# Patient Record
Sex: MALE | Race: WHITE | NOT HISPANIC OR LATINO | Employment: OTHER | URBAN - METROPOLITAN AREA
[De-identification: names, ages, dates, MRNs, and addresses within clinical notes are randomized per-mention and may not be internally consistent; named-entity substitution may affect disease eponyms.]

---

## 2022-08-22 ENCOUNTER — TRANSFERRED RECORDS (OUTPATIENT)
Dept: HEALTH INFORMATION MANAGEMENT | Facility: CLINIC | Age: 77
End: 2022-08-22

## 2022-09-01 ENCOUNTER — TRANSFERRED RECORDS (OUTPATIENT)
Dept: HEALTH INFORMATION MANAGEMENT | Facility: CLINIC | Age: 77
End: 2022-09-01

## 2022-09-06 ENCOUNTER — TRANSFERRED RECORDS (OUTPATIENT)
Dept: HEALTH INFORMATION MANAGEMENT | Facility: CLINIC | Age: 77
End: 2022-09-06

## 2022-09-21 ENCOUNTER — TRANSFERRED RECORDS (OUTPATIENT)
Dept: HEALTH INFORMATION MANAGEMENT | Facility: CLINIC | Age: 77
End: 2022-09-21

## 2022-10-19 ENCOUNTER — MEDICAL CORRESPONDENCE (OUTPATIENT)
Dept: HEALTH INFORMATION MANAGEMENT | Facility: CLINIC | Age: 77
End: 2022-10-19

## 2022-10-20 ENCOUNTER — TRANSFERRED RECORDS (OUTPATIENT)
Dept: HEALTH INFORMATION MANAGEMENT | Facility: CLINIC | Age: 77
End: 2022-10-20

## 2022-11-17 ENCOUNTER — OFFICE VISIT (OUTPATIENT)
Dept: FAMILY MEDICINE | Facility: CLINIC | Age: 77
End: 2022-11-17
Payer: MEDICARE

## 2022-11-17 VITALS
BODY MASS INDEX: 31.81 KG/M2 | OXYGEN SATURATION: 97 % | WEIGHT: 227.2 LBS | HEIGHT: 71 IN | SYSTOLIC BLOOD PRESSURE: 151 MMHG | RESPIRATION RATE: 20 BRPM | HEART RATE: 52 BPM | DIASTOLIC BLOOD PRESSURE: 80 MMHG | TEMPERATURE: 96.8 F

## 2022-11-17 DIAGNOSIS — Z01.818 PREOP GENERAL PHYSICAL EXAM: Primary | ICD-10-CM

## 2022-11-17 DIAGNOSIS — E78.5 HYPERLIPIDEMIA LDL GOAL <160: ICD-10-CM

## 2022-11-17 DIAGNOSIS — R03.0 ELEVATED BLOOD PRESSURE READING WITHOUT DIAGNOSIS OF HYPERTENSION: ICD-10-CM

## 2022-11-17 DIAGNOSIS — M54.42 CHRONIC LEFT-SIDED LOW BACK PAIN WITH LEFT-SIDED SCIATICA: ICD-10-CM

## 2022-11-17 DIAGNOSIS — G89.29 CHRONIC LEFT-SIDED LOW BACK PAIN WITH LEFT-SIDED SCIATICA: ICD-10-CM

## 2022-11-17 DIAGNOSIS — E87.0 HYPERNATREMIA: ICD-10-CM

## 2022-11-17 LAB
ANION GAP SERPL CALCULATED.3IONS-SCNC: 15 MMOL/L (ref 7–15)
BUN SERPL-MCNC: 12 MG/DL (ref 8–23)
CALCIUM SERPL-MCNC: 9 MG/DL (ref 8.8–10.2)
CHLORIDE SERPL-SCNC: 114 MMOL/L (ref 98–107)
CREAT SERPL-MCNC: 0.89 MG/DL (ref 0.67–1.17)
DEPRECATED HCO3 PLAS-SCNC: 23 MMOL/L (ref 22–29)
ERYTHROCYTE [DISTWIDTH] IN BLOOD BY AUTOMATED COUNT: 13.1 % (ref 10–15)
GFR SERPL CREATININE-BSD FRML MDRD: 89 ML/MIN/1.73M2
GLUCOSE SERPL-MCNC: 93 MG/DL (ref 70–99)
HCT VFR BLD AUTO: 44.5 % (ref 40–53)
HGB BLD-MCNC: 15.3 G/DL (ref 13.3–17.7)
MCH RBC QN AUTO: 30.4 PG (ref 26.5–33)
MCHC RBC AUTO-ENTMCNC: 34.4 G/DL (ref 31.5–36.5)
MCV RBC AUTO: 89 FL (ref 78–100)
PLATELET # BLD AUTO: 192 10E3/UL (ref 150–450)
POTASSIUM SERPL-SCNC: 4.4 MMOL/L (ref 3.4–5.3)
RBC # BLD AUTO: 5.03 10E6/UL (ref 4.4–5.9)
SODIUM SERPL-SCNC: 152 MMOL/L (ref 136–145)
WBC # BLD AUTO: 5 10E3/UL (ref 4–11)

## 2022-11-17 PROCEDURE — 36415 COLL VENOUS BLD VENIPUNCTURE: CPT | Performed by: PHYSICIAN ASSISTANT

## 2022-11-17 PROCEDURE — 93000 ELECTROCARDIOGRAM COMPLETE: CPT | Performed by: PHYSICIAN ASSISTANT

## 2022-11-17 PROCEDURE — 85027 COMPLETE CBC AUTOMATED: CPT | Performed by: PHYSICIAN ASSISTANT

## 2022-11-17 PROCEDURE — 80048 BASIC METABOLIC PNL TOTAL CA: CPT | Performed by: PHYSICIAN ASSISTANT

## 2022-11-17 PROCEDURE — 99204 OFFICE O/P NEW MOD 45 MIN: CPT | Performed by: PHYSICIAN ASSISTANT

## 2022-11-17 RX ORDER — MULTIVITAMIN
1 TABLET ORAL DAILY
COMMUNITY

## 2022-11-17 RX ORDER — ATORVASTATIN CALCIUM 40 MG/1
40 TABLET, FILM COATED ORAL DAILY
Status: CANCELLED | OUTPATIENT
Start: 2022-11-17

## 2022-11-17 RX ORDER — ATORVASTATIN CALCIUM 40 MG/1
40 TABLET, FILM COATED ORAL DAILY
Qty: 90 TABLET | Refills: 0 | Status: SHIPPED | OUTPATIENT
Start: 2022-11-17 | End: 2023-02-27

## 2022-11-17 RX ORDER — ATORVASTATIN CALCIUM 40 MG/1
40 TABLET, FILM COATED ORAL DAILY
COMMUNITY
Start: 2022-01-01 | End: 2022-11-17

## 2022-11-17 RX ORDER — ASPIRIN 81 MG/1
1 TABLET, CHEWABLE ORAL EVERY 24 HOURS
Status: ON HOLD | COMMUNITY
End: 2022-12-27

## 2022-11-17 RX ORDER — AMOXICILLIN 500 MG
1 CAPSULE ORAL DAILY
COMMUNITY

## 2022-11-17 ASSESSMENT — PAIN SCALES - GENERAL: PAINLEVEL: SEVERE PAIN (6)

## 2022-11-17 NOTE — PROGRESS NOTES
[address], [address]  [facility] [address]  Phone: [phone]  Primary Provider: No primary care provider on file.  Pre-op Performing Provider: MERRY ARANA PA-C      PREOPERATIVE EVALUATION:  Today's date: 11/17/2022    Bryon Meyer is a 77 year old male who presents for a preoperative evaluation.    Surgical Information:  Surgery/Procedure: LUMBAR 5 TO SACRAL 1 ANTERIOR LUMBAR INTERBODY FUSION WITH POSTERIOR INSTRUMENTED SPINAL FUSION. POSSIBLE REMOVAL OF HARDWARE of previous posterior lumbar instrumentation lumbar 3 to lumbar 5  Surgery Location:  OR  Surgeon: Luis De La Fuente and Arthur Brandt  Surgery Date: 11/22/2022  Time of Surgery: 12:30 PM  Where patient plans to recover: At home with family  Fax number for surgical facility: Note does not need to be faxed, will be available electronically in Epic.    Type of Anesthesia Anticipated: General    Assessment & Plan     The proposed surgical procedure is considered LOW risk.    Preop general physical exam  - pt advised to stop his asa, fish oil and vit E until after surgery  - pt is scheduled for his covid PCR test tomorrow at [facility]  - EKG 12-lead complete w/read - Clinics  - Basic metabolic panel  (Ca, Cl, CO2, Creat, Gluc, K, Na, BUN)  - CBC with platelets    Chronic left-sided low back pain with left-sided sciatica  - preop covid test dated 11/18/22 negative.    Hyperlipidemia LDL goal <160    - atorvastatin (LIPITOR) 40 MG tablet; Take 1 tablet (40 mg) by mouth daily    Elevated blood pressure reading without diagnosis of hypertension  - recd pt purchase an [employer] home BP monitor and check readings.   - recd BP be kept <140/90  - pt wonders if elevated BP related to his back pain; will follow  - will arrange pt to [facility] care with new PCP here since his former PCP in [place]    Hypernatremia  - pt advised to increase water intake and follow a low sodium diet and return for recheck of sodium on 11/21/22  -Rechecked sodium  today (11/21/22) and it has normalized.      Risks and Recommendations:  The patient has the following additional risks and recommendations for perioperative complications:   - No identified additional risk factors other than previously addressed    Medication Instructions:  hold asa, fish oil and vit E for one week prior to surgery    RECOMMENDATION:  APPROVAL GIVEN to proceed with proposed procedure, without further diagnostic evaluation.      Subjective     HPI related to upcoming procedure: Pt has long history of back pain with surgery around 2009 and 2018. Now having L hip to knee pain with standing or walking over the past 1+ year. Does get relief with sitting.  This can wake him up at night. Not taking any medication for pain besides a few ibuprofen.    Preop Questions 11/10/2022   1. Have you ever had a heart attack or stroke? No   2. Have you ever had surgery on your heart or blood vessels, such as a stent placement, a coronary artery bypass, or surgery on an artery in your head, neck, heart, or legs? No   3. Do you have chest pain with activity? No   4. Do you have a history of  heart failure? No   5. Do you currently have a cold, bronchitis or symptoms of other infection? No   6. Do you have a cough, shortness of breath, or wheezing? No   7. Do you or anyone in your family have previous history of blood clots? No   8. Do you or does anyone in your family have a serious bleeding problem such as prolonged bleeding following surgeries or cuts? No   9. Have you ever had problems with anemia or been told to take iron pills? No   10. Have you had any abnormal blood loss such as black, tarry or bloody stools? No   11. Have you ever had a blood transfusion? No   12. Are you willing to have a blood transfusion if it is medically needed before, during, or after your surgery? Yes   13. Have you or any of your relatives ever had problems with anesthesia? No   14. Do you have sleep apnea, excessive snoring or daytime  drowsiness? No   15. Do you have any artifical heart valves or other implanted medical devices like a pacemaker, defibrillator, or continuous glucose monitor? No   16. Do you have artificial joints? No   17. Are you allergic to latex? No       Health Care Directive:  Patient does not have a Health Care Directive or Living Will: Patient states has Advance Directive and will bring in a copy to clinic.    Preoperative Review of :   reviewed - no record of controlled substances prescribed.      Status of Chronic Conditions:  HYPERLIPIDEMIA - Patient has a long history of significant Hyperlipidemia requiring medication for treatment with recent good control. Patient reports no problems or side effects with the medication.       Review of Systems  CONSTITUTIONAL: NEGATIVE for fever, chills, change in weight  INTEGUMENTARY/SKIN: NEGATIVE for worrisome rashes, moles or lesions  EYES: NEGATIVE for vision changes or irritation  ENT/MOUTH: NEGATIVE for ear, mouth and throat problems  RESP: NEGATIVE for significant cough or SOB  CV: NEGATIVE for chest pain, palpitations or peripheral edema  GI: NEGATIVE for nausea, abdominal pain, heartburn, or change in bowel habits  : NEGATIVE for frequency, dysuria, or hematuria  MUSCULOSKELETAL: NEGATIVE for significant arthralgias or myalgia  NEURO: NEGATIVE for weakness, dizziness or paresthesias  ENDOCRINE: NEGATIVE for temperature intolerance, skin/hair changes  HEME: NEGATIVE for bleeding problems  PSYCHIATRIC: NEGATIVE for changes in mood or affect    Past Medical History:   Diagnosis Date     Hyperlipidemia LDL goal <160      Past Surgical History:   Procedure Laterality Date     COLONOSCOPY  2019     HERNIA REPAIR  2012     ORTHOPEDIC SURGERY  2018    lumbar  back     Current Outpatient Medications   Medication Sig Dispense Refill     ASCORBIC ACID PO Take 1 tablet by mouth daily       aspirin (ASA) 81 MG chewable tablet Take 1 tablet by mouth every 24 hours        "atorvastatin (LIPITOR) 40 MG tablet Take 1 tablet (40 mg) by mouth daily 90 tablet 0     Cholecalciferol (VITAMIN D3 PO) Take 1 tablet by mouth daily       Multiple vitamin TABS Take 1 tablet by mouth daily       Omega-3 Fatty Acids (FISH OIL PO) Take 1 capsule by mouth daily       VITAMIN E PO Take 1 tablet by mouth daily         No Known Allergies     Social History     Tobacco Use     Smoking status: Never     Smokeless tobacco: Never   Substance Use Topics     Alcohol use: Not Currently     Family History   Problem Relation Age of Onset     Other - See Comments Mother         101 yo     Throat cancer Father 78     Other Cancer Sister         COPD  Smoker     Other Cancer Brother         prostate     History   Drug Use Unknown         Objective     BP (!) 151/80 (BP Location: Left arm, Patient Position: Sitting, Cuff Size: Adult Large)   Pulse 52   Temp 96.8  F (36  C) (Temporal)   Resp 20   Ht 1.803 m (5' 11\")   Wt 103.1 kg (227 lb 3.2 oz)   SpO2 97%   BMI 31.69 kg/m      Physical Exam    GENERAL APPEARANCE: healthy, alert and no distress     EYES: EOMI,  PERRL     HENT: ear canals and TM's normal and nose and mouth without ulcers or lesions     NECK: no adenopathy, no asymmetry, masses, or scars and thyroid normal to palpation     RESP: lungs clear to auscultation - no rales, rhonchi or wheezes     CV: regular rates and rhythm, normal S1 S2, no S3 or S4 and no murmur, click or rub     ABDOMEN:  soft, nontender, no HSM or masses and bowel sounds normal     MS: extremities normal- no gross deformities noted, no evidence of inflammation in joints, FROM in all extremities.     SKIN: no suspicious lesions or rashes     NEURO: Normal strength and tone, sensory exam grossly normal, mentation intact and speech normal     PSYCH: mentation appears normal. and affect normal/bright     LYMPHATICS: No cervical adenopathy    Diagnostics:  Results for orders placed or performed in visit on 11/17/22   Basic metabolic " panel  (Ca, Cl, CO2, Creat, Gluc, K, Na, BUN)     Status: Abnormal   Result Value Ref Range    Sodium 152 (H) 136 - 145 mmol/L    Potassium 4.4 3.4 - 5.3 mmol/L    Chloride 114 (H) 98 - 107 mmol/L    Carbon Dioxide (CO2) 23 22 - 29 mmol/L    Anion Gap 15 7 - 15 mmol/L    Urea Nitrogen 12.0 8.0 - 23.0 mg/dL    Creatinine 0.89 0.67 - 1.17 mg/dL    Calcium 9.0 8.8 - 10.2 mg/dL    Glucose 93 70 - 99 mg/dL    GFR Estimate 89 >60 mL/min/1.73m2   CBC with platelets     Status: Normal   Result Value Ref Range    WBC Count 5.0 4.0 - 11.0 10e3/uL    RBC Count 5.03 4.40 - 5.90 10e6/uL    Hemoglobin 15.3 13.3 - 17.7 g/dL    Hematocrit 44.5 40.0 - 53.0 %    MCV 89 78 - 100 fL    MCH 30.4 26.5 - 33.0 pg    MCHC 34.4 31.5 - 36.5 g/dL    RDW 13.1 10.0 - 15.0 %    Platelet Count 192 150 - 450 10e3/uL     Last Comprehensive Metabolic Panel:  Sodium   Date Value Ref Range Status   11/21/2022 142 136 - 145 mmol/L Final     Potassium   Date Value Ref Range Status   11/21/2022 4.3 3.4 - 5.3 mmol/L Final     Chloride   Date Value Ref Range Status   11/21/2022 105 98 - 107 mmol/L Final     Carbon Dioxide (CO2)   Date Value Ref Range Status   11/21/2022 27 22 - 29 mmol/L Final     Anion Gap   Date Value Ref Range Status   11/21/2022 10 7 - 15 mmol/L Final     Glucose   Date Value Ref Range Status   11/21/2022 96 70 - 99 mg/dL Final     Urea Nitrogen   Date Value Ref Range Status   11/21/2022 14.2 8.0 - 23.0 mg/dL Final     Creatinine   Date Value Ref Range Status   11/21/2022 0.94 0.67 - 1.17 mg/dL Final     GFR Estimate   Date Value Ref Range Status   11/21/2022 84 >60 mL/min/1.73m2 Final     Comment:     Effective December 21, 2021 eGFRcr in adults is calculated using the 2021 CKD-EPI creatinine equation which includes age and gender (Scott arreaga al., NEJM, DOI: 10.1056/ULULfg4096695)     Calcium   Date Value Ref Range Status   11/21/2022 9.2 8.8 - 10.2 mg/dL Final         EKG: Sinus  Bradycardia   -Anterolateral ST-elevation -repolarization  variant.   PROBABLY NORMAL        Revised Cardiac Risk Index (RCRI):  The patient has the following serious cardiovascular risks for perioperative complications:   - No serious cardiac risks = 0 points     RCRI Interpretation: 0 points: Class I (very low risk - 0.4% complication rate)           Signed Electronically by: Cyndi Lovelace PA-C  Copy of this evaluation report is provided to requesting physician.

## 2022-11-17 NOTE — H&P (VIEW-ONLY)
[address], [address]  [facility] [address]  Phone: [phone]  Primary Provider: No primary care provider on file.  Pre-op Performing Provider: MERRY ARANA PA-C      PREOPERATIVE EVALUATION:  Today's date: 11/17/2022    Bryon Meyer is a 77 year old male who presents for a preoperative evaluation.    Surgical Information:  Surgery/Procedure: LUMBAR 5 TO SACRAL 1 ANTERIOR LUMBAR INTERBODY FUSION WITH POSTERIOR INSTRUMENTED SPINAL FUSION. POSSIBLE REMOVAL OF HARDWARE of previous posterior lumbar instrumentation lumbar 3 to lumbar 5  Surgery Location:  OR  Surgeon: Luis De La Fuente and Arthur Brandt  Surgery Date: 11/22/2022  Time of Surgery: 12:30 PM  Where patient plans to recover: At home with family  Fax number for surgical facility: Note does not need to be faxed, will be available electronically in Epic.    Type of Anesthesia Anticipated: General    Assessment & Plan     The proposed surgical procedure is considered LOW risk.    Preop general physical exam  - pt advised to stop his asa, fish oil and vit E until after surgery  - pt is scheduled for his covid PCR test tomorrow at [facility]  - EKG 12-lead complete w/read - Clinics  - Basic metabolic panel  (Ca, Cl, CO2, Creat, Gluc, K, Na, BUN)  - CBC with platelets    Chronic left-sided low back pain with left-sided sciatica  - preop covid test dated 11/18/22 negative.    Hyperlipidemia LDL goal <160    - atorvastatin (LIPITOR) 40 MG tablet; Take 1 tablet (40 mg) by mouth daily    Elevated blood pressure reading without diagnosis of hypertension  - recd pt purchase an [employer] home BP monitor and check readings.   - recd BP be kept <140/90  - pt wonders if elevated BP related to his back pain; will follow  - will arrange pt to [facility] care with new PCP here since his former PCP in [place]    Hypernatremia  - pt advised to increase water intake and follow a low sodium diet and return for recheck of sodium on 11/21/22  -Rechecked sodium  today (11/21/22) and it has normalized.      Risks and Recommendations:  The patient has the following additional risks and recommendations for perioperative complications:   - No identified additional risk factors other than previously addressed    Medication Instructions:  hold asa, fish oil and vit E for one week prior to surgery    RECOMMENDATION:  APPROVAL GIVEN to proceed with proposed procedure, without further diagnostic evaluation.      Subjective     HPI related to upcoming procedure: Pt has long history of back pain with surgery around 2009 and 2018. Now having L hip to knee pain with standing or walking over the past 1+ year. Does get relief with sitting.  This can wake him up at night. Not taking any medication for pain besides a few ibuprofen.    Preop Questions 11/10/2022   1. Have you ever had a heart attack or stroke? No   2. Have you ever had surgery on your heart or blood vessels, such as a stent placement, a coronary artery bypass, or surgery on an artery in your head, neck, heart, or legs? No   3. Do you have chest pain with activity? No   4. Do you have a history of  heart failure? No   5. Do you currently have a cold, bronchitis or symptoms of other infection? No   6. Do you have a cough, shortness of breath, or wheezing? No   7. Do you or anyone in your family have previous history of blood clots? No   8. Do you or does anyone in your family have a serious bleeding problem such as prolonged bleeding following surgeries or cuts? No   9. Have you ever had problems with anemia or been told to take iron pills? No   10. Have you had any abnormal blood loss such as black, tarry or bloody stools? No   11. Have you ever had a blood transfusion? No   12. Are you willing to have a blood transfusion if it is medically needed before, during, or after your surgery? Yes   13. Have you or any of your relatives ever had problems with anesthesia? No   14. Do you have sleep apnea, excessive snoring or daytime  drowsiness? No   15. Do you have any artifical heart valves or other implanted medical devices like a pacemaker, defibrillator, or continuous glucose monitor? No   16. Do you have artificial joints? No   17. Are you allergic to latex? No       Health Care Directive:  Patient does not have a Health Care Directive or Living Will: Patient states has Advance Directive and will bring in a copy to clinic.    Preoperative Review of :   reviewed - no record of controlled substances prescribed.      Status of Chronic Conditions:  HYPERLIPIDEMIA - Patient has a long history of significant Hyperlipidemia requiring medication for treatment with recent good control. Patient reports no problems or side effects with the medication.       Review of Systems  CONSTITUTIONAL: NEGATIVE for fever, chills, change in weight  INTEGUMENTARY/SKIN: NEGATIVE for worrisome rashes, moles or lesions  EYES: NEGATIVE for vision changes or irritation  ENT/MOUTH: NEGATIVE for ear, mouth and throat problems  RESP: NEGATIVE for significant cough or SOB  CV: NEGATIVE for chest pain, palpitations or peripheral edema  GI: NEGATIVE for nausea, abdominal pain, heartburn, or change in bowel habits  : NEGATIVE for frequency, dysuria, or hematuria  MUSCULOSKELETAL: NEGATIVE for significant arthralgias or myalgia  NEURO: NEGATIVE for weakness, dizziness or paresthesias  ENDOCRINE: NEGATIVE for temperature intolerance, skin/hair changes  HEME: NEGATIVE for bleeding problems  PSYCHIATRIC: NEGATIVE for changes in mood or affect    Past Medical History:   Diagnosis Date     Hyperlipidemia LDL goal <160      Past Surgical History:   Procedure Laterality Date     COLONOSCOPY  2019     HERNIA REPAIR  2012     ORTHOPEDIC SURGERY  2018    lumbar  back     Current Outpatient Medications   Medication Sig Dispense Refill     ASCORBIC ACID PO Take 1 tablet by mouth daily       aspirin (ASA) 81 MG chewable tablet Take 1 tablet by mouth every 24 hours        "atorvastatin (LIPITOR) 40 MG tablet Take 1 tablet (40 mg) by mouth daily 90 tablet 0     Cholecalciferol (VITAMIN D3 PO) Take 1 tablet by mouth daily       Multiple vitamin TABS Take 1 tablet by mouth daily       Omega-3 Fatty Acids (FISH OIL PO) Take 1 capsule by mouth daily       VITAMIN E PO Take 1 tablet by mouth daily         No Known Allergies     Social History     Tobacco Use     Smoking status: Never     Smokeless tobacco: Never   Substance Use Topics     Alcohol use: Not Currently     Family History   Problem Relation Age of Onset     Other - See Comments Mother         101 yo     Throat cancer Father 78     Other Cancer Sister         COPD  Smoker     Other Cancer Brother         prostate     History   Drug Use Unknown         Objective     BP (!) 151/80 (BP Location: Left arm, Patient Position: Sitting, Cuff Size: Adult Large)   Pulse 52   Temp 96.8  F (36  C) (Temporal)   Resp 20   Ht 1.803 m (5' 11\")   Wt 103.1 kg (227 lb 3.2 oz)   SpO2 97%   BMI 31.69 kg/m      Physical Exam    GENERAL APPEARANCE: healthy, alert and no distress     EYES: EOMI,  PERRL     HENT: ear canals and TM's normal and nose and mouth without ulcers or lesions     NECK: no adenopathy, no asymmetry, masses, or scars and thyroid normal to palpation     RESP: lungs clear to auscultation - no rales, rhonchi or wheezes     CV: regular rates and rhythm, normal S1 S2, no S3 or S4 and no murmur, click or rub     ABDOMEN:  soft, nontender, no HSM or masses and bowel sounds normal     MS: extremities normal- no gross deformities noted, no evidence of inflammation in joints, FROM in all extremities.     SKIN: no suspicious lesions or rashes     NEURO: Normal strength and tone, sensory exam grossly normal, mentation intact and speech normal     PSYCH: mentation appears normal. and affect normal/bright     LYMPHATICS: No cervical adenopathy    Diagnostics:  Results for orders placed or performed in visit on 11/17/22   Basic metabolic " panel  (Ca, Cl, CO2, Creat, Gluc, K, Na, BUN)     Status: Abnormal   Result Value Ref Range    Sodium 152 (H) 136 - 145 mmol/L    Potassium 4.4 3.4 - 5.3 mmol/L    Chloride 114 (H) 98 - 107 mmol/L    Carbon Dioxide (CO2) 23 22 - 29 mmol/L    Anion Gap 15 7 - 15 mmol/L    Urea Nitrogen 12.0 8.0 - 23.0 mg/dL    Creatinine 0.89 0.67 - 1.17 mg/dL    Calcium 9.0 8.8 - 10.2 mg/dL    Glucose 93 70 - 99 mg/dL    GFR Estimate 89 >60 mL/min/1.73m2   CBC with platelets     Status: Normal   Result Value Ref Range    WBC Count 5.0 4.0 - 11.0 10e3/uL    RBC Count 5.03 4.40 - 5.90 10e6/uL    Hemoglobin 15.3 13.3 - 17.7 g/dL    Hematocrit 44.5 40.0 - 53.0 %    MCV 89 78 - 100 fL    MCH 30.4 26.5 - 33.0 pg    MCHC 34.4 31.5 - 36.5 g/dL    RDW 13.1 10.0 - 15.0 %    Platelet Count 192 150 - 450 10e3/uL     Last Comprehensive Metabolic Panel:  Sodium   Date Value Ref Range Status   11/21/2022 142 136 - 145 mmol/L Final     Potassium   Date Value Ref Range Status   11/21/2022 4.3 3.4 - 5.3 mmol/L Final     Chloride   Date Value Ref Range Status   11/21/2022 105 98 - 107 mmol/L Final     Carbon Dioxide (CO2)   Date Value Ref Range Status   11/21/2022 27 22 - 29 mmol/L Final     Anion Gap   Date Value Ref Range Status   11/21/2022 10 7 - 15 mmol/L Final     Glucose   Date Value Ref Range Status   11/21/2022 96 70 - 99 mg/dL Final     Urea Nitrogen   Date Value Ref Range Status   11/21/2022 14.2 8.0 - 23.0 mg/dL Final     Creatinine   Date Value Ref Range Status   11/21/2022 0.94 0.67 - 1.17 mg/dL Final     GFR Estimate   Date Value Ref Range Status   11/21/2022 84 >60 mL/min/1.73m2 Final     Comment:     Effective December 21, 2021 eGFRcr in adults is calculated using the 2021 CKD-EPI creatinine equation which includes age and gender (Scott arreaga al., NEJM, DOI: 10.1056/CJGJhv3461092)     Calcium   Date Value Ref Range Status   11/21/2022 9.2 8.8 - 10.2 mg/dL Final         EKG: Sinus  Bradycardia   -Anterolateral ST-elevation -repolarization  variant.   PROBABLY NORMAL        Revised Cardiac Risk Index (RCRI):  The patient has the following serious cardiovascular risks for perioperative complications:   - No serious cardiac risks = 0 points     RCRI Interpretation: 0 points: Class I (very low risk - 0.4% complication rate)           Signed Electronically by: Cyndi Lovelace PA-C  Copy of this evaluation report is provided to requesting physician.

## 2022-11-18 ENCOUNTER — LAB (OUTPATIENT)
Dept: URGENT CARE | Facility: URGENT CARE | Age: 77
End: 2022-11-18
Payer: MEDICARE

## 2022-11-18 DIAGNOSIS — Z20.822 ENCOUNTER FOR LABORATORY TESTING FOR COVID-19 VIRUS: ICD-10-CM

## 2022-11-18 LAB — SARS-COV-2 RNA RESP QL NAA+PROBE: NEGATIVE

## 2022-11-18 PROCEDURE — U0003 INFECTIOUS AGENT DETECTION BY NUCLEIC ACID (DNA OR RNA); SEVERE ACUTE RESPIRATORY SYNDROME CORONAVIRUS 2 (SARS-COV-2) (CORONAVIRUS DISEASE [COVID-19]), AMPLIFIED PROBE TECHNIQUE, MAKING USE OF HIGH THROUGHPUT TECHNOLOGIES AS DESCRIBED BY CMS-2020-01-R: HCPCS

## 2022-11-18 PROCEDURE — U0005 INFEC AGEN DETEC AMPLI PROBE: HCPCS

## 2022-11-18 NOTE — RESULT ENCOUNTER NOTE
"Please CALL pt today and help arrange lab only appt on the morning of 11/21. I will also send him a [employer] message.    [place],     Your sodium level is very high.  Please increase your water intake and follow a low salt diet. Work on getting 8oz of water at least 6 times per day.  I need to have you return to to have your sodium rechecked on Monday before your surgery to make sure this normalizes.  Please schedule a \"lab only\" appointment to get this done. You do not need to be fasting.  Your labs were otherwise normal.    Cyndi Lovelace PA-C    "

## 2022-11-21 ENCOUNTER — LAB (OUTPATIENT)
Dept: LAB | Facility: CLINIC | Age: 77
End: 2022-11-21
Payer: MEDICARE

## 2022-11-21 ENCOUNTER — ANESTHESIA EVENT (OUTPATIENT)
Dept: SURGERY | Facility: CLINIC | Age: 77
DRG: 454 | End: 2022-11-21
Payer: MEDICARE

## 2022-11-21 DIAGNOSIS — E87.0 HYPERNATREMIA: ICD-10-CM

## 2022-11-21 LAB
ANION GAP SERPL CALCULATED.3IONS-SCNC: 10 MMOL/L (ref 7–15)
BUN SERPL-MCNC: 14.2 MG/DL (ref 8–23)
CALCIUM SERPL-MCNC: 9.2 MG/DL (ref 8.8–10.2)
CHLORIDE SERPL-SCNC: 105 MMOL/L (ref 98–107)
CREAT SERPL-MCNC: 0.94 MG/DL (ref 0.67–1.17)
DEPRECATED HCO3 PLAS-SCNC: 27 MMOL/L (ref 22–29)
GFR SERPL CREATININE-BSD FRML MDRD: 84 ML/MIN/1.73M2
GLUCOSE SERPL-MCNC: 96 MG/DL (ref 70–99)
POTASSIUM SERPL-SCNC: 4.3 MMOL/L (ref 3.4–5.3)
SODIUM SERPL-SCNC: 142 MMOL/L (ref 136–145)

## 2022-11-21 PROCEDURE — 36415 COLL VENOUS BLD VENIPUNCTURE: CPT

## 2022-11-21 PROCEDURE — 80048 BASIC METABOLIC PNL TOTAL CA: CPT

## 2022-11-21 NOTE — PROGRESS NOTES
PTA medications updated by Medication Scribe prior to surgery via phone call with patient (last doses completed by Nurse)     Medication history sources: Patient, Surescripts and H&P  In the past week, patient estimated taking medication this percent of the time: Greater than 90%  Adherence assessment: N/A Not Observed    Significant changes made to the medication list:  None      Additional medication history information:   None    Medication reconciliation completed by provider prior to medication history? No    Time spent in this activity: 35 minutes    The information provided in this note is only as accurate as the sources available at the time of update(s)      Prior to Admission medications    Medication Sig Last Dose Taking? Auth Provider Long Term End Date   ASCORBIC ACID PO Take 1 tablet by mouth daily 11/15/2022 at AM Yes Reported, Patient     aspirin (ASA) 81 MG chewable tablet Take 1 tablet by mouth every 24 hours 11/15/2022 at AM Yes Reported, Patient     atorvastatin (LIPITOR) 40 MG tablet Take 1 tablet (40 mg) by mouth daily  at AM Yes Cyndi Lovelace PA-C Yes    Cholecalciferol (VITAMIN D3 PO) Take 1 tablet by mouth daily 11/15/2022 at AM Yes Reported, Patient     Multiple vitamin TABS Take 1 tablet by mouth daily 11/15/2022 at AM Yes Reported, Patient     Omega-3 Fatty Acids (FISH OIL) 1200 MG capsule Take 1 capsule by mouth daily 11/15/2022 at AM Yes Reported, Patient     VITAMIN E PO Take 1 tablet by mouth daily More than a month at AM Yes Reported, Patient

## 2022-11-22 ENCOUNTER — APPOINTMENT (OUTPATIENT)
Dept: GENERAL RADIOLOGY | Facility: CLINIC | Age: 77
DRG: 454 | End: 2022-11-22
Attending: STUDENT IN AN ORGANIZED HEALTH CARE EDUCATION/TRAINING PROGRAM
Payer: MEDICARE

## 2022-11-22 ENCOUNTER — HOSPITAL ENCOUNTER (INPATIENT)
Facility: CLINIC | Age: 77
LOS: 1 days | Discharge: HOME | DRG: 454 | End: 2022-11-23
Attending: STUDENT IN AN ORGANIZED HEALTH CARE EDUCATION/TRAINING PROGRAM | Admitting: STUDENT IN AN ORGANIZED HEALTH CARE EDUCATION/TRAINING PROGRAM
Payer: MEDICARE

## 2022-11-22 ENCOUNTER — ANESTHESIA (OUTPATIENT)
Dept: SURGERY | Facility: CLINIC | Age: 77
DRG: 454 | End: 2022-11-22
Payer: MEDICARE

## 2022-11-22 ENCOUNTER — APPOINTMENT (OUTPATIENT)
Dept: SURGERY | Facility: PHYSICIAN GROUP | Age: 77
End: 2022-11-22
Payer: MEDICARE

## 2022-11-22 DIAGNOSIS — G89.18 ACUTE POST-OPERATIVE PAIN: Primary | ICD-10-CM

## 2022-11-22 LAB
ABO/RH(D): NORMAL
ANTIBODY SCREEN: NEGATIVE
SPECIMEN EXPIRATION DATE: NORMAL

## 2022-11-22 PROCEDURE — 710N000009 HC RECOVERY PHASE 1, LEVEL 1, PER MIN: Performed by: STUDENT IN AN ORGANIZED HEALTH CARE EDUCATION/TRAINING PROGRAM

## 2022-11-22 PROCEDURE — 250N000011 HC RX IP 250 OP 636: Performed by: STUDENT IN AN ORGANIZED HEALTH CARE EDUCATION/TRAINING PROGRAM

## 2022-11-22 PROCEDURE — 258N000003 HC RX IP 258 OP 636: Performed by: STUDENT IN AN ORGANIZED HEALTH CARE EDUCATION/TRAINING PROGRAM

## 2022-11-22 PROCEDURE — 0ST40ZZ RESECTION OF LUMBOSACRAL DISC, OPEN APPROACH: ICD-10-PCS | Performed by: STUDENT IN AN ORGANIZED HEALTH CARE EDUCATION/TRAINING PROGRAM

## 2022-11-22 PROCEDURE — 272N000001 HC OR GENERAL SUPPLY STERILE: Performed by: STUDENT IN AN ORGANIZED HEALTH CARE EDUCATION/TRAINING PROGRAM

## 2022-11-22 PROCEDURE — 250N000009 HC RX 250: Performed by: NURSE ANESTHETIST, CERTIFIED REGISTERED

## 2022-11-22 PROCEDURE — L8699 PROSTHETIC IMPLANT NOS: HCPCS | Performed by: STUDENT IN AN ORGANIZED HEALTH CARE EDUCATION/TRAINING PROGRAM

## 2022-11-22 PROCEDURE — 01NB0ZZ RELEASE LUMBAR NERVE, OPEN APPROACH: ICD-10-PCS | Performed by: STUDENT IN AN ORGANIZED HEALTH CARE EDUCATION/TRAINING PROGRAM

## 2022-11-22 PROCEDURE — 999N000179 XR SURGERY CARM FLUORO LESS THAN 5 MIN W STILLS

## 2022-11-22 PROCEDURE — 360N000087 HC SURGERY LEVEL 7 W/ FLUORO, PER MIN: Performed by: STUDENT IN AN ORGANIZED HEALTH CARE EDUCATION/TRAINING PROGRAM

## 2022-11-22 PROCEDURE — 36415 COLL VENOUS BLD VENIPUNCTURE: CPT | Performed by: STUDENT IN AN ORGANIZED HEALTH CARE EDUCATION/TRAINING PROGRAM

## 2022-11-22 PROCEDURE — 250N000011 HC RX IP 250 OP 636: Performed by: NURSE ANESTHETIST, CERTIFIED REGISTERED

## 2022-11-22 PROCEDURE — 250N000013 HC RX MED GY IP 250 OP 250 PS 637: Performed by: STUDENT IN AN ORGANIZED HEALTH CARE EDUCATION/TRAINING PROGRAM

## 2022-11-22 PROCEDURE — 250N000025 HC SEVOFLURANE, PER MIN: Performed by: STUDENT IN AN ORGANIZED HEALTH CARE EDUCATION/TRAINING PROGRAM

## 2022-11-22 PROCEDURE — 999N000141 HC STATISTIC PRE-PROCEDURE NURSING ASSESSMENT: Performed by: STUDENT IN AN ORGANIZED HEALTH CARE EDUCATION/TRAINING PROGRAM

## 2022-11-22 PROCEDURE — 258N000003 HC RX IP 258 OP 636: Performed by: ANESTHESIOLOGY

## 2022-11-22 PROCEDURE — 258N000003 HC RX IP 258 OP 636: Performed by: NURSE ANESTHETIST, CERTIFIED REGISTERED

## 2022-11-22 PROCEDURE — C1762 CONN TISS, HUMAN(INC FASCIA): HCPCS | Performed by: STUDENT IN AN ORGANIZED HEALTH CARE EDUCATION/TRAINING PROGRAM

## 2022-11-22 PROCEDURE — 86901 BLOOD TYPING SEROLOGIC RH(D): CPT | Performed by: STUDENT IN AN ORGANIZED HEALTH CARE EDUCATION/TRAINING PROGRAM

## 2022-11-22 PROCEDURE — 86850 RBC ANTIBODY SCREEN: CPT | Performed by: STUDENT IN AN ORGANIZED HEALTH CARE EDUCATION/TRAINING PROGRAM

## 2022-11-22 PROCEDURE — 0SG30A0 FUSION OF LUMBOSACRAL JOINT WITH INTERBODY FUSION DEVICE, ANTERIOR APPROACH, ANTERIOR COLUMN, OPEN APPROACH: ICD-10-PCS | Performed by: STUDENT IN AN ORGANIZED HEALTH CARE EDUCATION/TRAINING PROGRAM

## 2022-11-22 PROCEDURE — 250N000011 HC RX IP 250 OP 636: Performed by: ANESTHESIOLOGY

## 2022-11-22 PROCEDURE — 8E0W0CZ ROBOTIC ASSISTED PROCEDURE OF TRUNK REGION, OPEN APPROACH: ICD-10-PCS | Performed by: STUDENT IN AN ORGANIZED HEALTH CARE EDUCATION/TRAINING PROGRAM

## 2022-11-22 PROCEDURE — 0SG3071 FUSION OF LUMBOSACRAL JOINT WITH AUTOLOGOUS TISSUE SUBSTITUTE, POSTERIOR APPROACH, POSTERIOR COLUMN, OPEN APPROACH: ICD-10-PCS | Performed by: STUDENT IN AN ORGANIZED HEALTH CARE EDUCATION/TRAINING PROGRAM

## 2022-11-22 PROCEDURE — 250N000009 HC RX 250: Performed by: STUDENT IN AN ORGANIZED HEALTH CARE EDUCATION/TRAINING PROGRAM

## 2022-11-22 PROCEDURE — 22558 ARTHRD ANT NTRBD MIN DSC LUM: CPT | Mod: 62 | Performed by: SURGERY

## 2022-11-22 PROCEDURE — 370N000017 HC ANESTHESIA TECHNICAL FEE, PER MIN: Performed by: STUDENT IN AN ORGANIZED HEALTH CARE EDUCATION/TRAINING PROGRAM

## 2022-11-22 PROCEDURE — C1713 ANCHOR/SCREW BN/BN,TIS/BN: HCPCS | Performed by: STUDENT IN AN ORGANIZED HEALTH CARE EDUCATION/TRAINING PROGRAM

## 2022-11-22 DEVICE — IMP ROD MEDT SOLERA CVD 5.5X60MM TI 1553201060: Type: IMPLANTABLE DEVICE | Site: SPINE LUMBAR | Status: FUNCTIONAL

## 2022-11-22 DEVICE — GRAFT BONE ACCELERATE GRAFTON 2 CANNULA SET 3CC T50203: Type: IMPLANTABLE DEVICE | Site: SPINE LUMBAR | Status: FUNCTIONAL

## 2022-11-22 DEVICE — IMP SCR MEDT 5.5/6.0MM SOLERA 6.5X50MM MA 55840006550: Type: IMPLANTABLE DEVICE | Site: SPINE LUMBAR | Status: FUNCTIONAL

## 2022-11-22 DEVICE — BONE GRAFT KIT 7510100 INFUSE X SMALL
Type: IMPLANTABLE DEVICE | Site: SPINE LUMBAR | Status: FUNCTIONAL
Brand: INFUSE® BONE GRAFT

## 2022-11-22 DEVICE — IMP SCR SET MEDT SOLERA BREAK OFF 5.5MM TI 5540030: Type: IMPLANTABLE DEVICE | Site: SPINE LUMBAR | Status: FUNCTIONAL

## 2022-11-22 DEVICE — IMPLANTABLE DEVICE: Type: IMPLANTABLE DEVICE | Site: SPINE LUMBAR | Status: FUNCTIONAL

## 2022-11-22 DEVICE — IMP ROD MEDT SOLERA CVD 5.5X50MM TI 1553201050: Type: IMPLANTABLE DEVICE | Site: SPINE LUMBAR | Status: FUNCTIONAL

## 2022-11-22 DEVICE — BONE GRAFT KIT 7510050 INFUSE XX SMALL
Type: IMPLANTABLE DEVICE | Site: SPINE LUMBAR | Status: FUNCTIONAL
Brand: INFUSE® BONE GRAFT

## 2022-11-22 RX ORDER — GABAPENTIN 100 MG/1
100 CAPSULE ORAL
Status: COMPLETED | OUTPATIENT
Start: 2022-11-22 | End: 2022-11-22

## 2022-11-22 RX ORDER — HYDROMORPHONE HYDROCHLORIDE 1 MG/ML
INJECTION, SOLUTION INTRAMUSCULAR; INTRAVENOUS; SUBCUTANEOUS PRN
Status: DISCONTINUED | OUTPATIENT
Start: 2022-11-22 | End: 2022-11-22

## 2022-11-22 RX ORDER — HYDROMORPHONE HCL IN WATER/PF 6 MG/30 ML
0.2 PATIENT CONTROLLED ANALGESIA SYRINGE INTRAVENOUS
Status: DISCONTINUED | OUTPATIENT
Start: 2022-11-22 | End: 2022-11-23 | Stop reason: HOSPADM

## 2022-11-22 RX ORDER — FENTANYL CITRATE 50 UG/ML
25 INJECTION, SOLUTION INTRAMUSCULAR; INTRAVENOUS EVERY 5 MIN PRN
Status: DISCONTINUED | OUTPATIENT
Start: 2022-11-22 | End: 2022-11-22 | Stop reason: HOSPADM

## 2022-11-22 RX ORDER — NALOXONE HYDROCHLORIDE 0.4 MG/ML
0.2 INJECTION, SOLUTION INTRAMUSCULAR; INTRAVENOUS; SUBCUTANEOUS
Status: DISCONTINUED | OUTPATIENT
Start: 2022-11-22 | End: 2022-11-23 | Stop reason: HOSPADM

## 2022-11-22 RX ORDER — POLYETHYLENE GLYCOL 3350 17 G/17G
17 POWDER, FOR SOLUTION ORAL DAILY
Status: DISCONTINUED | OUTPATIENT
Start: 2022-11-23 | End: 2022-11-23 | Stop reason: HOSPADM

## 2022-11-22 RX ORDER — TRANEXAMIC ACID 10 MG/ML
1000 INJECTION, SOLUTION INTRAVENOUS ONCE
Status: COMPLETED | OUTPATIENT
Start: 2022-11-22 | End: 2022-11-22

## 2022-11-22 RX ORDER — ACETAMINOPHEN 325 MG/1
650 TABLET ORAL EVERY 4 HOURS PRN
Status: DISCONTINUED | OUTPATIENT
Start: 2022-11-25 | End: 2022-11-23 | Stop reason: HOSPADM

## 2022-11-22 RX ORDER — FENTANYL CITRATE 50 UG/ML
INJECTION, SOLUTION INTRAMUSCULAR; INTRAVENOUS PRN
Status: DISCONTINUED | OUTPATIENT
Start: 2022-11-22 | End: 2022-11-22

## 2022-11-22 RX ORDER — HYDROMORPHONE HCL IN WATER/PF 6 MG/30 ML
0.4 PATIENT CONTROLLED ANALGESIA SYRINGE INTRAVENOUS
Status: DISCONTINUED | OUTPATIENT
Start: 2022-11-22 | End: 2022-11-23 | Stop reason: HOSPADM

## 2022-11-22 RX ORDER — BUPIVACAINE HYDROCHLORIDE AND EPINEPHRINE 5; 5 MG/ML; UG/ML
INJECTION, SOLUTION PERINEURAL PRN
Status: DISCONTINUED | OUTPATIENT
Start: 2022-11-22 | End: 2022-11-22 | Stop reason: HOSPADM

## 2022-11-22 RX ORDER — AMOXICILLIN 250 MG
1 CAPSULE ORAL 2 TIMES DAILY
Status: DISCONTINUED | OUTPATIENT
Start: 2022-11-22 | End: 2022-11-23 | Stop reason: HOSPADM

## 2022-11-22 RX ORDER — CEFAZOLIN SODIUM/WATER 2 G/20 ML
2 SYRINGE (ML) INTRAVENOUS
Status: COMPLETED | OUTPATIENT
Start: 2022-11-22 | End: 2022-11-22

## 2022-11-22 RX ORDER — DEXAMETHASONE SODIUM PHOSPHATE 4 MG/ML
INJECTION, SOLUTION INTRA-ARTICULAR; INTRALESIONAL; INTRAMUSCULAR; INTRAVENOUS; SOFT TISSUE PRN
Status: DISCONTINUED | OUTPATIENT
Start: 2022-11-22 | End: 2022-11-22

## 2022-11-22 RX ORDER — ATORVASTATIN CALCIUM 40 MG/1
40 TABLET, FILM COATED ORAL DAILY
Status: DISCONTINUED | OUTPATIENT
Start: 2022-11-23 | End: 2022-11-23 | Stop reason: HOSPADM

## 2022-11-22 RX ORDER — PROPOFOL 10 MG/ML
INJECTION, EMULSION INTRAVENOUS PRN
Status: DISCONTINUED | OUTPATIENT
Start: 2022-11-22 | End: 2022-11-22

## 2022-11-22 RX ORDER — CEFAZOLIN SODIUM/WATER 2 G/20 ML
2 SYRINGE (ML) INTRAVENOUS SEE ADMIN INSTRUCTIONS
Status: DISCONTINUED | OUTPATIENT
Start: 2022-11-22 | End: 2022-11-22

## 2022-11-22 RX ORDER — BISACODYL 10 MG
10 SUPPOSITORY, RECTAL RECTAL DAILY PRN
Status: DISCONTINUED | OUTPATIENT
Start: 2022-11-22 | End: 2022-11-23 | Stop reason: HOSPADM

## 2022-11-22 RX ORDER — VANCOMYCIN HYDROCHLORIDE 1 G/20ML
INJECTION, POWDER, LYOPHILIZED, FOR SOLUTION INTRAVENOUS PRN
Status: DISCONTINUED | OUTPATIENT
Start: 2022-11-22 | End: 2022-11-22 | Stop reason: HOSPADM

## 2022-11-22 RX ORDER — SODIUM CHLORIDE, SODIUM LACTATE, POTASSIUM CHLORIDE, CALCIUM CHLORIDE 600; 310; 30; 20 MG/100ML; MG/100ML; MG/100ML; MG/100ML
INJECTION, SOLUTION INTRAVENOUS CONTINUOUS
Status: DISCONTINUED | OUTPATIENT
Start: 2022-11-22 | End: 2022-11-23 | Stop reason: HOSPADM

## 2022-11-22 RX ORDER — HYDROMORPHONE HCL IN WATER/PF 6 MG/30 ML
0.2 PATIENT CONTROLLED ANALGESIA SYRINGE INTRAVENOUS EVERY 5 MIN PRN
Status: DISCONTINUED | OUTPATIENT
Start: 2022-11-22 | End: 2022-11-22 | Stop reason: HOSPADM

## 2022-11-22 RX ORDER — VECURONIUM BROMIDE 1 MG/ML
INJECTION, POWDER, LYOPHILIZED, FOR SOLUTION INTRAVENOUS PRN
Status: DISCONTINUED | OUTPATIENT
Start: 2022-11-22 | End: 2022-11-22

## 2022-11-22 RX ORDER — FENTANYL CITRATE 50 UG/ML
50 INJECTION, SOLUTION INTRAMUSCULAR; INTRAVENOUS EVERY 5 MIN PRN
Status: DISCONTINUED | OUTPATIENT
Start: 2022-11-22 | End: 2022-11-22 | Stop reason: HOSPADM

## 2022-11-22 RX ORDER — HYDROMORPHONE HCL IN WATER/PF 6 MG/30 ML
0.4 PATIENT CONTROLLED ANALGESIA SYRINGE INTRAVENOUS EVERY 5 MIN PRN
Status: DISCONTINUED | OUTPATIENT
Start: 2022-11-22 | End: 2022-11-22 | Stop reason: HOSPADM

## 2022-11-22 RX ORDER — ONDANSETRON 2 MG/ML
4 INJECTION INTRAMUSCULAR; INTRAVENOUS EVERY 30 MIN PRN
Status: DISCONTINUED | OUTPATIENT
Start: 2022-11-22 | End: 2022-11-22 | Stop reason: HOSPADM

## 2022-11-22 RX ORDER — ONDANSETRON 4 MG/1
4 TABLET, ORALLY DISINTEGRATING ORAL EVERY 30 MIN PRN
Status: DISCONTINUED | OUTPATIENT
Start: 2022-11-22 | End: 2022-11-22 | Stop reason: HOSPADM

## 2022-11-22 RX ORDER — ACETAMINOPHEN 325 MG/1
975 TABLET ORAL EVERY 8 HOURS
Status: DISCONTINUED | OUTPATIENT
Start: 2022-11-22 | End: 2022-11-23 | Stop reason: HOSPADM

## 2022-11-22 RX ORDER — EPHEDRINE SULFATE 50 MG/ML
INJECTION, SOLUTION INTRAMUSCULAR; INTRAVENOUS; SUBCUTANEOUS PRN
Status: DISCONTINUED | OUTPATIENT
Start: 2022-11-22 | End: 2022-11-22

## 2022-11-22 RX ORDER — CEFAZOLIN SODIUM 1 G/3ML
1 INJECTION, POWDER, FOR SOLUTION INTRAMUSCULAR; INTRAVENOUS EVERY 8 HOURS
Status: DISCONTINUED | OUTPATIENT
Start: 2022-11-23 | End: 2022-11-23 | Stop reason: HOSPADM

## 2022-11-22 RX ORDER — GLYCOPYRROLATE 0.2 MG/ML
INJECTION, SOLUTION INTRAMUSCULAR; INTRAVENOUS PRN
Status: DISCONTINUED | OUTPATIENT
Start: 2022-11-22 | End: 2022-11-22

## 2022-11-22 RX ORDER — OXYCODONE HYDROCHLORIDE 5 MG/1
10 TABLET ORAL EVERY 4 HOURS PRN
Status: DISCONTINUED | OUTPATIENT
Start: 2022-11-22 | End: 2022-11-23 | Stop reason: HOSPADM

## 2022-11-22 RX ORDER — OXYCODONE HYDROCHLORIDE 5 MG/1
5 TABLET ORAL EVERY 4 HOURS PRN
Status: DISCONTINUED | OUTPATIENT
Start: 2022-11-22 | End: 2022-11-23 | Stop reason: HOSPADM

## 2022-11-22 RX ORDER — SODIUM CHLORIDE, SODIUM LACTATE, POTASSIUM CHLORIDE, CALCIUM CHLORIDE 600; 310; 30; 20 MG/100ML; MG/100ML; MG/100ML; MG/100ML
INJECTION, SOLUTION INTRAVENOUS CONTINUOUS PRN
Status: DISCONTINUED | OUTPATIENT
Start: 2022-11-22 | End: 2022-11-22

## 2022-11-22 RX ORDER — NALOXONE HYDROCHLORIDE 0.4 MG/ML
0.4 INJECTION, SOLUTION INTRAMUSCULAR; INTRAVENOUS; SUBCUTANEOUS
Status: DISCONTINUED | OUTPATIENT
Start: 2022-11-22 | End: 2022-11-23 | Stop reason: HOSPADM

## 2022-11-22 RX ORDER — TRANEXAMIC ACID 10 MG/ML
1 INJECTION, SOLUTION INTRAVENOUS CONTINUOUS
Status: DISCONTINUED | OUTPATIENT
Start: 2022-11-22 | End: 2022-11-22

## 2022-11-22 RX ORDER — SODIUM CHLORIDE, SODIUM LACTATE, POTASSIUM CHLORIDE, CALCIUM CHLORIDE 600; 310; 30; 20 MG/100ML; MG/100ML; MG/100ML; MG/100ML
INJECTION, SOLUTION INTRAVENOUS CONTINUOUS
Status: DISCONTINUED | OUTPATIENT
Start: 2022-11-22 | End: 2022-11-22 | Stop reason: HOSPADM

## 2022-11-22 RX ORDER — MAGNESIUM HYDROXIDE 1200 MG/15ML
LIQUID ORAL PRN
Status: DISCONTINUED | OUTPATIENT
Start: 2022-11-22 | End: 2022-11-22 | Stop reason: HOSPADM

## 2022-11-22 RX ORDER — LIDOCAINE 40 MG/G
CREAM TOPICAL
Status: DISCONTINUED | OUTPATIENT
Start: 2022-11-22 | End: 2022-11-23 | Stop reason: HOSPADM

## 2022-11-22 RX ORDER — LIDOCAINE HYDROCHLORIDE 20 MG/ML
INJECTION, SOLUTION INFILTRATION; PERINEURAL PRN
Status: DISCONTINUED | OUTPATIENT
Start: 2022-11-22 | End: 2022-11-22

## 2022-11-22 RX ORDER — ONDANSETRON 2 MG/ML
INJECTION INTRAMUSCULAR; INTRAVENOUS PRN
Status: DISCONTINUED | OUTPATIENT
Start: 2022-11-22 | End: 2022-11-22

## 2022-11-22 RX ADMIN — VECURONIUM BROMIDE 2 MG: 1 INJECTION, POWDER, LYOPHILIZED, FOR SOLUTION INTRAVENOUS at 17:03

## 2022-11-22 RX ADMIN — TRANEXAMIC ACID 1 MG/KG/HR: 10 INJECTION, SOLUTION INTRAVENOUS at 12:45

## 2022-11-22 RX ADMIN — VECURONIUM BROMIDE 2 MG: 1 INJECTION, POWDER, LYOPHILIZED, FOR SOLUTION INTRAVENOUS at 13:40

## 2022-11-22 RX ADMIN — FENTANYL CITRATE 100 MCG: 50 INJECTION, SOLUTION INTRAMUSCULAR; INTRAVENOUS at 12:20

## 2022-11-22 RX ADMIN — FENTANYL CITRATE 25 MCG: 50 INJECTION, SOLUTION INTRAMUSCULAR; INTRAVENOUS at 18:47

## 2022-11-22 RX ADMIN — ROCURONIUM BROMIDE 50 MG: 50 INJECTION, SOLUTION INTRAVENOUS at 12:22

## 2022-11-22 RX ADMIN — ONDANSETRON 4 MG: 2 INJECTION INTRAMUSCULAR; INTRAVENOUS at 17:08

## 2022-11-22 RX ADMIN — Medication 2.5 MG: at 13:22

## 2022-11-22 RX ADMIN — SENNOSIDES AND DOCUSATE SODIUM 1 TABLET: 50; 8.6 TABLET ORAL at 21:13

## 2022-11-22 RX ADMIN — Medication 5 MG: at 12:41

## 2022-11-22 RX ADMIN — Medication 2.5 MG: at 13:40

## 2022-11-22 RX ADMIN — SODIUM CHLORIDE, POTASSIUM CHLORIDE, SODIUM LACTATE AND CALCIUM CHLORIDE: 600; 310; 30; 20 INJECTION, SOLUTION INTRAVENOUS at 21:05

## 2022-11-22 RX ADMIN — VECURONIUM BROMIDE 2 MG: 1 INJECTION, POWDER, LYOPHILIZED, FOR SOLUTION INTRAVENOUS at 15:05

## 2022-11-22 RX ADMIN — Medication 2 G: at 12:25

## 2022-11-22 RX ADMIN — HYDROMORPHONE HYDROCHLORIDE 0.2 MG: 0.2 INJECTION, SOLUTION INTRAMUSCULAR; INTRAVENOUS; SUBCUTANEOUS at 19:05

## 2022-11-22 RX ADMIN — VECURONIUM BROMIDE 3 MG: 1 INJECTION, POWDER, LYOPHILIZED, FOR SOLUTION INTRAVENOUS at 14:08

## 2022-11-22 RX ADMIN — HYDROMORPHONE HYDROCHLORIDE 0.5 MG: 1 INJECTION, SOLUTION INTRAMUSCULAR; INTRAVENOUS; SUBCUTANEOUS at 12:50

## 2022-11-22 RX ADMIN — VECURONIUM BROMIDE 2 MG: 1 INJECTION, POWDER, LYOPHILIZED, FOR SOLUTION INTRAVENOUS at 15:29

## 2022-11-22 RX ADMIN — VECURONIUM BROMIDE 2 MG: 1 INJECTION, POWDER, LYOPHILIZED, FOR SOLUTION INTRAVENOUS at 13:29

## 2022-11-22 RX ADMIN — FENTANYL CITRATE 25 MCG: 50 INJECTION, SOLUTION INTRAMUSCULAR; INTRAVENOUS at 18:38

## 2022-11-22 RX ADMIN — TRANEXAMIC ACID 1000 MG: 10 INJECTION, SOLUTION INTRAVENOUS at 12:27

## 2022-11-22 RX ADMIN — VECURONIUM BROMIDE 1 MG: 1 INJECTION, POWDER, LYOPHILIZED, FOR SOLUTION INTRAVENOUS at 14:33

## 2022-11-22 RX ADMIN — SODIUM CHLORIDE, POTASSIUM CHLORIDE, SODIUM LACTATE AND CALCIUM CHLORIDE: 600; 310; 30; 20 INJECTION, SOLUTION INTRAVENOUS at 11:46

## 2022-11-22 RX ADMIN — GLYCOPYRROLATE 0.1 MG: 0.2 INJECTION, SOLUTION INTRAMUSCULAR; INTRAVENOUS at 12:56

## 2022-11-22 RX ADMIN — SODIUM CHLORIDE, POTASSIUM CHLORIDE, SODIUM LACTATE AND CALCIUM CHLORIDE: 600; 310; 30; 20 INJECTION, SOLUTION INTRAVENOUS at 16:58

## 2022-11-22 RX ADMIN — ACETAMINOPHEN 975 MG: 325 TABLET, FILM COATED ORAL at 21:13

## 2022-11-22 RX ADMIN — GABAPENTIN 100 MG: 100 CAPSULE ORAL at 11:47

## 2022-11-22 RX ADMIN — SODIUM CHLORIDE, POTASSIUM CHLORIDE, SODIUM LACTATE AND CALCIUM CHLORIDE: 600; 310; 30; 20 INJECTION, SOLUTION INTRAVENOUS at 12:28

## 2022-11-22 RX ADMIN — PROPOFOL 200 MG: 10 INJECTION, EMULSION INTRAVENOUS at 12:22

## 2022-11-22 RX ADMIN — HYDROMORPHONE HYDROCHLORIDE 0.5 MG: 1 INJECTION, SOLUTION INTRAMUSCULAR; INTRAVENOUS; SUBCUTANEOUS at 15:11

## 2022-11-22 RX ADMIN — LIDOCAINE HYDROCHLORIDE 100 MG: 20 INJECTION, SOLUTION INFILTRATION; PERINEURAL at 12:20

## 2022-11-22 RX ADMIN — DEXAMETHASONE SODIUM PHOSPHATE 4 MG: 4 INJECTION, SOLUTION INTRA-ARTICULAR; INTRALESIONAL; INTRAMUSCULAR; INTRAVENOUS; SOFT TISSUE at 12:30

## 2022-11-22 RX ADMIN — SUGAMMADEX 200 MG: 100 INJECTION, SOLUTION INTRAVENOUS at 17:55

## 2022-11-22 RX ADMIN — VECURONIUM BROMIDE 1 MG: 1 INJECTION, POWDER, LYOPHILIZED, FOR SOLUTION INTRAVENOUS at 16:17

## 2022-11-22 RX ADMIN — VECURONIUM BROMIDE 2 MG: 1 INJECTION, POWDER, LYOPHILIZED, FOR SOLUTION INTRAVENOUS at 12:46

## 2022-11-22 RX ADMIN — Medication 2 G: at 16:25

## 2022-11-22 ASSESSMENT — ACTIVITIES OF DAILY LIVING (ADL)
ADLS_ACUITY_SCORE: 20
ADLS_ACUITY_SCORE: 33
ADLS_ACUITY_SCORE: 20

## 2022-11-22 ASSESSMENT — LIFESTYLE VARIABLES: TOBACCO_USE: 0

## 2022-11-22 NOTE — ANESTHESIA PROCEDURE NOTES
Airway         Procedure Start/Stop Times: 11/22/2022 12:25 PM  Staff -        Anesthesiologist:  Jose Roy MD       CRNA: Joy Cisneros APRN CRNA       Performed By: CRNA  Consent for Airway        Urgency: elective  Indications and Patient Condition       Indications for airway management: filomena-procedural       Induction type:intravenous       Mask difficulty assessment: 2 - vent by mask + OA or adjuvant +/- NMBA    Final Airway Details       Final airway type: endotracheal airway       Successful airway: ETT - single  Endotracheal Airway Details        ETT size (mm): 8.0       Cuffed: yes       Successful intubation technique: video laryngoscopy       VL Blade Size: Glidescope 4       Grade View of Cords: 1       Adjucts: stylet       Position: Right       Measured from: lips       Secured at (cm): 24       Bite block used: None    Post intubation assessment        Placement verified by: capnometry, equal breath sounds and chest rise        Number of attempts at approach: 1       Secured with: commercial tube trujillo and pink tape       Ease of procedure: easy       Dentition: Intact and Unchanged    Medication(s) Administered   Medication Administration Time: 11/22/2022 12:25 PM

## 2022-11-22 NOTE — OP NOTE
Preoperative diagnosis: Back pain with failure conservative management.    Postoperative diagnosis: Same.    Procedure: Left retroperitoneal exposure of L5-S1 and intervertebral disc space for anterior lumbar interbody fusion.    Surgeon: Arthur Brandt M.D.   Co-surgeon: Luis De La Fuente M.D.     Anesthesia: General.  Complication: None.  Estimated blood loss: About 100 mL.    Indication for procedure: This is a 77-year-old male with previous spinal surgery and continued back pain which has failed conservative management.  Patient has been under care of Dr. De La Fuente from the spine team and patient is going to get anterior lumbar interbody fusion.  Vascular surgery is requested to provide help with surgical exposure.    Procedure details: Patient was identified and then taken to the operating room and placed in supine position.  General anesthesia was induced.  Preoperative intravenous antibiotics were administered.  Anterior abdominal wall was prepped in a sterile surgical field was created.  Preprocedure timeout was conducted.    A 10 cm left paramedian infraumbilical incision was made with a #10 blade and deepened with electrocautery.  The left rectus abdominis muscle and fascia were identified.  Left anterior rectus sheath was opened along the length of the incision.  The left rectus abdominis muscle was retracted laterally.  The perforating branches of the deep inferior epigastric artery and vein were secured with titanium clips to prevent bleeding from avulsion as we performed a retroperitoneal dissection.  Retroperitoneal plane was started in the left iliac fossa.  Retroperitoneal viscera including the left ureter were reflected superomedially.  The left common iliac artery and vein were identified.  The L5-S1 intervertebral disc space was identified.  Median sacral artery and vein were secured with titanium clips as well as with electrocautery to achieve complete hemostasis.  The spinal needle was placed in the L5-S1  intervertebral disc space and its identity confirmed by fluoroscopy.    Dr. De La Fuente proceeded with discectomy and cage implantation.  Following successful completion of that part of the procedure adequate hemostasis was confirmed.  Vancomycin powder was sprinkled on the cage.  Retroperitoneal viscera were allowed to fall back into the natural configuration.  The rectus fascia was approximated with #1 Vicryl in figure-of-eight fashion.  Dorcas's layer was approximated with 2-0 Vicryl.  Skin was approximated with 3-0 Monocryl in subcuticular fashion.    Counts of instruments, sponges and needle was noted to be correct.    I left the operating room and the spine team proceeded with placing the patient prone for posterior instrumentation.

## 2022-11-22 NOTE — INTERVAL H&P NOTE
"I have reviewed the surgical (or preoperative) H&P that is linked to this encounter, and examined the patient. There are no significant changes    Clinical Conditions Present on Arrival:  Clinically Significant Risk Factors Present on Admission           # Hypernatremia: Highest Na = 152 mmol/L (Ref range: 136-145) in last 30 days, will monitor as appropriate          # Obesity: Estimated body mass index is 31.58 kg/m  as calculated from the following:    Height as of this encounter: 1.803 m (5' 11\").    Weight as of this encounter: 102.7 kg (226 lb 6.4 oz).       "

## 2022-11-22 NOTE — OP NOTE
Orthopedic Surgery Operative Report    Patient:   Bryon Meyer  MRN:   [id number]   :  1945  Facility:    Date:  22         PREOPERATIVE DIAGNOSIS:    L5-S1 foraminal stenosis     POSTOPERATIVE DIAGNOSIS:    Same     PROCEDURE PERFORMED:    1. Placement of Medtronic pedicle screws bilaterally at S1  2. Placement of aleisha connector rods to existing construct  3. L5 -S1  posterior lateral fusion    4. Exploration of previous fusion  5. Robotic assisted spine surgery with use of Mazor  6. Intraoperative microscope  7. Use of portable X-ray fluoroscopy    Implants:  Implant Name Type Inv. Item Serial No.  Lot No. LRB No. Used Action   GRAFT BONE INFUSE BMP [id number] - [id number]  GRAFT BONE INFUSE BMP XXSM [id number]  [employer] [id number] N/A 1 Implanted   GRAFT BONE INFUSE BMP XSM [id number] - [id number]  GRAFT BONE INFUSE BMP XSM [id number]  [employer] [id number] N/A 1 Implanted   GRAFT BONE ACCELERATE ISAC 2 CANNULA SET 3CC [id number] - [id number] Graft GRAFT BONE ACCELERATE ISAC 2 CANNULA SET 3CC [id number] [employer]  N/A 1 Implanted   LS SPACER    [employer] [id number] N/A 1 Implanted   ANTERALIGN SPINAL SYSTEM WITH TITAN NANOLOCK SURFACE TECHNOLOGY SCREW Metallic Hardware/[employer]   [employer] [id number] N/A 3 Implanted   IMP SCR MEDT 5.5/6.0MM SOLERA 6.5X50MM MA [id number] - [id number] Metallic Hardware/[employer] IMP SCR MEDT 5.5/6.0MM SOLERA 6.5X50MM MA [id number]  [employer] INC 41 05 2022 N/A 2 Implanted   IMP SCR SET MEDT SOLERA BREAK OFF 5.5MM TI [id number] - [id number] Metallic Hardware/[employer] IMP SCR SET MEDT SOLERA BREAK OFF 5.5MM TI [id number]  [employer] 41 05 2022 N/A 6 Implanted   Side-top connector Metallic Hardware/[employer]   [employer] 41  N/A 2 Implanted   IMP ROSIE MEDT SOLERA CVD 5.5X60MM TI [id number] - [id number] Metallic Hardware/[employer] IMP ROSIE MEDT SOLERA CVD 5.5X60MM TI [id number]   [employer] 41 05 21 NOV 2022 N/A 1 Implanted   IMP ROSIE MEDT SOLERA CVD 5.5X50MM TI [id number] - [id number] Metallic Hardware/[place] IMP ROSIE MEDT SOLERA CVD 5.5X50MM TI [id number]  [employer] 41 05 21 NOV 2022 N/A 1 Implanted       SURGEON:    Luis De La Fuente MD     ASSISTANT:  Camila Robles CSA whose assistance was used for positioning the patient on the fracture table and closed reduction.       ANESTHESIA:  General     COMPLICATIONS:     None     ESTIMATED BLOOD LOSS:  200    INDICATION FOR OPERATION:  Bryon Meyer is a 77 year old male who presented to my clinic with chronic back pain and radiculopathy.  Despite non operative treatment the patient continued to have increased pain.After reviewing the imaging studies and examination, the decision was made to proceed with the above procedure. The patient understood the risk of surgery to be infection, CSF leak, nerve root injury, failure of improvement of his symptoms. The patient voiced understanding and wanted to proceed.     I used fluoroscopy to register the [employer] system to the patient, placing a PSIS pin as a static reference.  I then utilized the [employer] system to nathaniel the ideal incision sites for my preoperatively planned pedicle screws. I then injected local anesthetic and made longitudinal paramedial incisions over the screw trajectory entry sites.  I carried dissection down to myofascia.     I then used the [employer] robotic system to place my preoperatively planned screws at S1 in the following manner:     I incised through the myofascia down to the start point with a long-handled knife.  I then placed the soft-tissue dilator down to the start point, and used the navigated Midas drill to a depth of 30 mm.  I then tapped each trajectory to the neurocentral junction.  I then placed my Voyager screws according to my preoperatively planned lengths and diameters.     I retracted the soft tissues from the Transverse processes of L5 and sacral ala as well as  L5-S1  facet, and used a Midas bur to remove all cartilage and bony endplate from the facet joint down to bleeding bone.  I then placed Medtronic DBF allograft into the decorticated facet joint to promote a posterior fusion across that site.     I placed my bilateral rods and connected them to the existing construct via lateral connectors and final-tightened them. Final fluoroscopic shots were taken and I was satisfied with the construct.       The operative sites were thoroughly irrigated.  Fascia was closed with interrupted 0 Vicryl, subcutaneous tissues closed with 2-0 Vicryl, and skin closed with Exofin and steri-strips.  The patient was allowed to emerge from anesthesia and transported to PACU in stable condition.     A surgical assistant was critical for this case to assist in retraction of the neural elements and soft tissues to facilitate a safer operation with improved visualization and less time under anesthesia.      All sponge and needle counts were correct at case conclusion.

## 2022-11-22 NOTE — ANESTHESIA PREPROCEDURE EVALUATION
Anesthesia Pre-Procedure Evaluation    Patient: Bryon Meyer   MRN: [id number] : 1945        Procedure : Procedure(s):  LUMBAR 5 TO SACRAL 1 ANTERIOR LUMBAR INTERBODY FUSION  WITH POSTERIOR INSTRUMENTED SPINAL FUSION, POSSIBLE REMOVAL OF HARDWARE of previous posterior lumbar instrumentation lumbar 3 to lumbar 5          Past Medical History:   Diagnosis Date     Hyperlipidemia LDL goal <160       Past Surgical History:   Procedure Laterality Date     COLONOSCOPY  2019     HERNIA REPAIR  2012     ORTHOPEDIC SURGERY  2018    lumbar  back      No Known Allergies   Social History     Tobacco Use     Smoking status: Never     Smokeless tobacco: Never   Substance Use Topics     Alcohol use: Not Currently      Wt Readings from Last 1 Encounters:   22 103.1 kg (227 lb 3.2 oz)        Anesthesia Evaluation            ROS/MED HX  ENT/Pulmonary:    (-) tobacco use and sleep apnea   Neurologic:       Cardiovascular:     (+) Dyslipidemia -----    METS/Exercise Tolerance:     Hematologic:       Musculoskeletal: Comment: LBP with sciatica      GI/Hepatic:    (-) GERD   Renal/Genitourinary:       Endo:       Psychiatric/Substance Use:       Infectious Disease:       Malignancy:       Other:            Physical Exam    Airway        Mallampati: III   TM distance: > 3 FB   Neck ROM: full   Mouth opening: > 3 cm    Respiratory Devices and Support         Dental  no notable dental history         Cardiovascular   cardiovascular exam normal          Pulmonary   pulmonary exam normal                OUTSIDE LABS:  CBC:   Lab Results   Component Value Date    WBC 5.0 2022    HGB 15.3 2022    HCT 44.5 2022     2022     BMP:   Lab Results   Component Value Date     2022     (H) 2022    POTASSIUM 4.3 2022    POTASSIUM 4.4 2022    CHLORIDE 105 2022    CHLORIDE 114 (H) 2022    CO2 27 2022    CO2 23 2022    BUN 14.2 2022    BUN 12.0  11/17/2022    CR 0.94 11/21/2022    CR 0.89 11/17/2022    GLC 96 11/21/2022    GLC 93 11/17/2022     COAGS: No results found for: PTT, INR, FIBR  POC: No results found for: BGM, HCG, HCGS  HEPATIC: No results found for: ALBUMIN, PROTTOTAL, ALT, AST, GGT, ALKPHOS, BILITOTAL, BILIDIRECT, KWAME  OTHER:   Lab Results   Component Value Date    FAINA 9.2 11/21/2022       Anesthesia Plan    ASA Status:  2   NPO Status:  NPO Appropriate    Anesthesia Type: General.     - Airway: ETT   Induction: Intravenous, Propofol.   Maintenance: Inhalation.   Techniques and Equipment:     - Lines/Monitors: 2nd IV     Consents    Anesthesia Plan(s) and associated risks, benefits, and realistic alternatives discussed. Questions answered and patient/representative(s) expressed understanding.    - Discussed:     - Discussed with:  Patient         Postoperative Care    Pain management: IV analgesics, Oral pain medications.   PONV prophylaxis: Ondansetron (or other 5HT-3), Dexamethasone or Solumedrol     Comments:                Jose Roy MD

## 2022-11-22 NOTE — DISCHARGE INSTRUCTIONS
Lumbar fusion-instruction.    INCISION  The incision will be covered by a dressing for the first 7 days after surgery. You may remove this  dressing after 7-10 days. Then you may use gauze and paper tape to cover the incision to avoid  irritation by clothing or brace. Please check your incision at least twice daily for signs and  symptoms of infection:  If any of the below should occur, please call the office.  -Drainage from incisional site  -Opening of incisions  -Fevers greater than 101  -Flu-like symptoms  -Increased redness and/or tenderness  -If you have staples or sutures (not tape) in your incision they may be removed 2 weeks  following your surgery.  This may be done by a visiting nurse, family physician or by making an  appointment to come into the office.    BRACE  You must wear your brace at all times when you are out of bed, unless otherwise instructed by  surgeon.  Always wear a T-shirt under your brace so that it isn t in contact with your bare skin.  The brace may cause you to sweat and you may feel warm; this can irritate your incision so pay  special attention to the above  incision  instructions.              SHOWERING  Do not shower until 7-10days after surgery. At that point, you may shower but no direct water on  the surgical incision. Make sure to dry the incision and cover with dry gauze. No tub baths, hot  tubs, or a whirlpool until your wound is completely healed at approximately 6-8 weeks.      EXERCISE    Lift objects weighing less than 10-15 lbs  Do not bend or twist at the waist-always bend your knees!!  Limit your sitting to 20-30 minute intervals.  You should lie down or walk in between sitting  periods.  There are no limitations for sitting in a recliner chair.  Walk as much as possible-let discomfort be your guide.  You may also go up and down stairs as  much as you can tolerate.  Walking outside (as long as it is nice weather) or walking on a  treadmill is permitted (no  incline).    PAIN  Take pain medication as prescribed. As your pain level decreases, you may begin to take over-  the-counter Extra Strength Tylenol.  DO NOT take any anti-inflammatories (like Motrin, Advil,  ibuprofen) for 10 weeks after surgery. Taking anti-inflammatories can decrease fusion healing.    Do not resume taking Fosamax, if you are taking it, for 8 weeks after your fusion surgery.    DRIVING  You may NOT drive a car until completely off narcotic pain medications. You may be a  passenger in a car.  If you must take a longer trip, make sure to make stops approximately  every 1.5-2 hours so that you can walk around and stretch your legs to prevent blood clots.   Reclining in the passenger seat may be the most comfortable position.    FOLLOW-UP  You will have a follow-up appointment in approximately 3 weeks from surgery with x-rays.

## 2022-11-22 NOTE — OP NOTE
Orthopedic Surgery Operative Report    Patient:   Bryon Meyer  MRN:   [id number]   :  1945  Facility: [facility]   Date:  22         PREOPERATIVE DIAGNOSIS:    L5-S1 Foraminal stenosis with left lower extremity radiculopathy     POSTOPERATIVE DIAGNOSIS:    Same     PROCEDURE PERFORMED:    1. L5-S1 Anterior discectomy with arthrodesis and placement of Medtronic Anteralign cage   2. Use of C-arm     SURGEON:    Luis De La Fuente MD    Co-Surgeon:   Dr. Brandt     ASSISTANT:  Camila Robles CSA  whose assistance was used for positioning the patient on the fracture table and closed reduction.       ANESTHESIA:  General     COMPLICATIONS:     None     ESTIMATED BLOOD LOSS:  100  INDICATION FOR OPERATION:  Bryon Meyer is a 77 year old male who presented to my clinic with chronic back pain and radiculopathy.  Despite non operative treatment the patient continued to have increased pain.After reviewing the imaging studies and examination, the decision was made to proceed with the above procedure. The patient understood the risk of surgery to be infection, CSF leak, nerve root injury, failure of improvement of his symptoms. The patient voiced understanding and wanted to proceed.     The anterior approach was performed by Dr. Brandt, please see his operative report for full details.       After approaching, we came down on the sacral promontory at L5-S1.  I inserted a spinal needle into the midline disc, and we confirmed radiographically that we were in the appropriate disc level.     I next performed an annulotomy and then began clearing out the disc material with a combination of curettes, Kerrisons, and Tapia elevators. I removed all disc material down to the vertebral endplate which demonstrated punctate bleeding, which I deemed to be an appropriate site preparation for fusion.      I next used my rasps on the endplates, and then progressed in trial size to a 16 mm cage at which point  there was an appropriate amount of tension and I did not want to progress further for the risk of loading the vertebral endplates too greatly and potentially causing an endplate fracture.     I found that the size 16 cage trial fit best with the patient's anatomy and the exposure we were able to obtain of the disc level.  I took fluoroscopy images confirming position and size both in AP and lateral planes.  I selected a 12 degree cage, 16 mm height.  It was filled with graft and a small kit of BMP.  The cage was impacted into position and confirmed fluoroscopically to be in appropriate position.  I then punched and placed screws into the cranial and caudal levels to secure the cage.  All screws had adequate purchase.     We took final x-rays and I was happy with the construct.  We then began closure.     For the details of closure please see Dr. Brandt's note.     The planned second (posterior) stage of the procedure was performed immediately following this anterior procedure.     A Vascular Surgery trained co-surgeon was critical for this case to assist in mobilizing and retracting the great vessels to facilitate a safer operation with improved visualization.      All sponge and needle counts were correct at case conclusion.

## 2022-11-23 ENCOUNTER — APPOINTMENT (OUTPATIENT)
Dept: GENERAL RADIOLOGY | Facility: CLINIC | Age: 77
DRG: 454 | End: 2022-11-23
Attending: STUDENT IN AN ORGANIZED HEALTH CARE EDUCATION/TRAINING PROGRAM
Payer: MEDICARE

## 2022-11-23 ENCOUNTER — APPOINTMENT (OUTPATIENT)
Dept: PHYSICAL THERAPY | Facility: CLINIC | Age: 77
DRG: 454 | End: 2022-11-23
Attending: STUDENT IN AN ORGANIZED HEALTH CARE EDUCATION/TRAINING PROGRAM
Payer: MEDICARE

## 2022-11-23 VITALS
DIASTOLIC BLOOD PRESSURE: 70 MMHG | OXYGEN SATURATION: 98 % | RESPIRATION RATE: 16 BRPM | WEIGHT: 226.4 LBS | HEART RATE: 91 BPM | BODY MASS INDEX: 31.69 KG/M2 | SYSTOLIC BLOOD PRESSURE: 121 MMHG | TEMPERATURE: 99.1 F | HEIGHT: 71 IN

## 2022-11-23 PROBLEM — G89.18 ACUTE POST-OPERATIVE PAIN: Status: ACTIVE | Noted: 2022-11-23

## 2022-11-23 LAB — GLUCOSE BLDC GLUCOMTR-MCNC: 126 MG/DL (ref 70–99)

## 2022-11-23 PROCEDURE — 97116 GAIT TRAINING THERAPY: CPT | Mod: GP

## 2022-11-23 PROCEDURE — 72100 X-RAY EXAM L-S SPINE 2/3 VWS: CPT

## 2022-11-23 PROCEDURE — L0650 LSO SC R ANT/POS PNL PRE OTS: HCPCS

## 2022-11-23 PROCEDURE — 97161 PT EVAL LOW COMPLEX 20 MIN: CPT | Mod: GP

## 2022-11-23 PROCEDURE — 250N000011 HC RX IP 250 OP 636: Performed by: STUDENT IN AN ORGANIZED HEALTH CARE EDUCATION/TRAINING PROGRAM

## 2022-11-23 PROCEDURE — 82962 GLUCOSE BLOOD TEST: CPT

## 2022-11-23 PROCEDURE — 250N000013 HC RX MED GY IP 250 OP 250 PS 637: Performed by: STUDENT IN AN ORGANIZED HEALTH CARE EDUCATION/TRAINING PROGRAM

## 2022-11-23 PROCEDURE — 97530 THERAPEUTIC ACTIVITIES: CPT | Mod: GP

## 2022-11-23 PROCEDURE — 120N000001 HC R&B MED SURG/OB

## 2022-11-23 RX ORDER — METHOCARBAMOL 500 MG/1
500 TABLET, FILM COATED ORAL 4 TIMES DAILY PRN
Qty: 60 TABLET | Refills: 0 | Status: SHIPPED | OUTPATIENT
Start: 2022-11-23 | End: 2023-01-06

## 2022-11-23 RX ORDER — ONDANSETRON 4 MG/1
4 TABLET, FILM COATED ORAL EVERY 8 HOURS PRN
Qty: 30 TABLET | Refills: 0 | Status: SHIPPED | OUTPATIENT
Start: 2022-11-23 | End: 2023-01-06

## 2022-11-23 RX ORDER — AMOXICILLIN 250 MG
1 CAPSULE ORAL DAILY
Qty: 60 TABLET | Refills: 1 | Status: SHIPPED | OUTPATIENT
Start: 2022-11-23 | End: 2023-01-06

## 2022-11-23 RX ORDER — OXYCODONE HYDROCHLORIDE 5 MG/1
5 TABLET ORAL EVERY 6 HOURS PRN
Qty: 30 TABLET | Refills: 0 | Status: SHIPPED | OUTPATIENT
Start: 2022-11-23 | End: 2022-12-01

## 2022-11-23 RX ADMIN — ACETAMINOPHEN 975 MG: 325 TABLET, FILM COATED ORAL at 05:19

## 2022-11-23 RX ADMIN — CEFAZOLIN 1 G: 1 INJECTION, POWDER, FOR SOLUTION INTRAMUSCULAR; INTRAVENOUS at 08:21

## 2022-11-23 RX ADMIN — ATORVASTATIN CALCIUM 40 MG: 40 TABLET, FILM COATED ORAL at 08:36

## 2022-11-23 RX ADMIN — ACETAMINOPHEN 975 MG: 325 TABLET, FILM COATED ORAL at 13:50

## 2022-11-23 RX ADMIN — POLYETHYLENE GLYCOL 3350 17 G: 17 POWDER, FOR SOLUTION ORAL at 08:36

## 2022-11-23 RX ADMIN — CEFAZOLIN 1 G: 1 INJECTION, POWDER, FOR SOLUTION INTRAMUSCULAR; INTRAVENOUS at 00:19

## 2022-11-23 RX ADMIN — SENNOSIDES AND DOCUSATE SODIUM 1 TABLET: 50; 8.6 TABLET ORAL at 08:36

## 2022-11-23 ASSESSMENT — ACTIVITIES OF DAILY LIVING (ADL)
ADLS_ACUITY_SCORE: 20

## 2022-11-23 NOTE — PLAN OF CARE
Goal Outcome Evaluation:    Patient vital signs are at baseline: Yes, on RA  Patient able to ambulate as they were prior to admission or with assist devices provided by therapies during their stay:  Yes, up in room w/ Ax1 GBW  Patient MUST void prior to discharge:  No, Sauer removed at 6AM.  Patient able to tolerate oral intake:  Yes  Pain has adequate pain control using Oral analgesics:  Yes, pain managed w/ schedule tylenol    A&Ox4. Anterior & posterior dressing CDI. CMS intact. Hemovac in place & patent. PIV infusing. Will continue to monitor.

## 2022-11-23 NOTE — PLAN OF CARE
Goal Outcome Evaluation:          patient is alert and oriented X4. VSS on RA with good sat. On regular diet with good appetite. IV access discontinued. CMS intact . Dressing dry and intact. AVS reviewed with patient wife and patient , all questions were answered. Patient verbalized understanding. Education provided regarding brace, fall & safety precautions  and surgical incision on lower back. Patient advised to follow up with  PCP in 2 weeks . Patient discharge home with medications  and all belongings returned.

## 2022-11-23 NOTE — PROGRESS NOTES
11/23/22 0829   Appointment Info   Signing Clinician's Name / Credentials (PT) Duncan Drew, PT, DPT   Rehab Comments (PT) spinal precautions, needs FWW   Living Environment   People in Home spouse   Current Living Arrangements house  (2 levels with elevator)   Home Accessibility stairs to enter home   Number of Stairs, Main Entrance 1   Stair Railings, Main Entrance none   Transportation Anticipated family or friend will provide   Living Environment Comments Patient lives two level home with spouse. Has elevator within the home. 1 step to enter with no handrail. Has walk-in shower with built in bench   Self-Care   Usual Activity Tolerance good   Current Activity Tolerance moderate   Fall history within last six months yes   Number of times patient has fallen within last six months 1   Activity/Exercise/Self-Care Comment Patient reported being independent with mobility and cares at baseline with no assistive device.   General Information   Onset of Illness/Injury or Date of Surgery 11/22/22   Referring Physician Luis De La Fuente MD   Patient/Family Therapy Goals Statement (PT) Patient would like to go home   Pertinent History of Current Problem (include personal factors and/or comorbidities that impact the POC) 77 year old male s/p L5-S1 anterior lumbar interbody fusion with posterior instrumented spinal fusion   Existing Precautions/Restrictions fall   Cognition   Affect/Mental Status (Cognition) WNL   Orientation Status (Cognition) oriented x 4   Follows Commands (Cognition) WNL   Pain Assessment   Patient Currently in Pain Yes, see Vital Sign flowsheet  (dull throbbing)   Strength (Manual Muscle Testing)   Strength Comments knee extension strength 5/5 on R, 4+/5 on L   Bed Mobility   Bed Mobility sit-supine;supine-sit   Supine-Sit Arnold (Bed Mobility) contact guard   Sit-Supine Arnold (Bed Mobility) contact guard   Comment, (Bed Mobility) CGA for supine<>sit transfers with logroll technique    Transfers   Transfers sit-stand transfer   Sit-Stand Transfer   Sit-Stand Z[place]ata (Transfers) supervision;contact guard   Assistive Device (Sit-Stand Transfers) walker, front-wheeled   Comment, (Sit-Stand Transfer) sit<>stand with initial   Gait/Stairs (Locomotion)   Z[place]ata Level (Gait) contact guard   Assistive Device (Gait) walker, front-wheeled   Distance in Feet (Required for LE Total Joints) 10' eval   Distance in Feet (Gait) 175' x 2   Comment, (Gait/Stairs) Ambulating with FWW and CGA for 10'   Balance   Balance Comments no deficits with BUE support on FWW   Sensory Examination   Sensory Perception patient reports no sensory changes   Clinical Impression   Criteria for Skilled Therapeutic Intervention Yes, treatment indicated   PT Diagnosis (PT) impaired mobility   Influenced by the following impairments generalized weakness, reduced activity tolerance, post-operative pain, limited spinal ROM per precautions   Functional limitations due to impairments impaired functional mobility, impaired gait, transfers, stair navigation   Clinical Presentation (PT Evaluation Complexity) Stable/Uncomplicated   Clinical Presentation Rationale clinical judgement   Clinical Decision Making (Complexity) low complexity   Planned Therapy Interventions (PT) balance training;bed mobility training;gait training;stair training;strengthening;patient/family education;neuromuscular re-education;transfer training;progressive activity/exercise   Anticipated Equipment Needs at Discharge (PT) walker, rolling   Risk & Benefits of therapy have been explained evaluation/treatment results reviewed;care plan/treatment goals reviewed;risks/benefits reviewed;current/potential barriers reviewed;participants voiced agreement with care plan;participants included;patient;spouse/significant other;daughter   PT Total Evaluation Time   PT Eval, Low Complexity Minutes (07125) 12   Physical Therapy Goals   PT Frequency 2x/day   PT Predicted  Duration/Target Date for Goal Attainment 11/26/22   PT Goals Bed Mobility;Transfers;Gait;Stairs   PT: Bed Mobility Modified independent;Supine to/from sit  (logroll technique)   PT: Transfers Modified independent;Sit to/from stand;Assistive device   PT: Gait Modified independent;Rolling walker;Greater than 200 feet   PT: Stairs Supervision/stand-by assist;1 stair  (platform step with FWW)   Interventions   Interventions Quick Adds Gait Training;Therapeutic Activity   Therapeutic Activity   Therapeutic Activities: dynamic activities to improve functional performance Minutes (52017) 15   Symptoms Noted During/After Treatment Fatigue;Increased pain   Treatment Detail/Skilled Intervention Patient supine in bed at beginning of session - agreeable to participate in PT. Educated on spinal precautions and provided handout which was reviewed briefly. Educated on logroll technique. Performed with initial CGA, cues and facilitation for sequencing. Progressed to supervision, HOB flat while performing. Patient seated at EOB and denies symptoms. Performing several sit<>stands throughout session with FWW and CGA. Progressing to supervision. Cues for sequencing and hand placement. Discussed discharge recommendations, and recommendation to utilize walker upon discharge. Patient's family arrived during session with mobility questions. Answered as able. Discussed gradual and progressive increase in activity as tolerated. PAtient supine in bed at end of session with call light next to him and bed alarm on.   Gait Training   Gait Training Minutes (71355) 14   Symptoms Noted During/After Treatment (Gait Training) fatigue   Treatment Detail/Skilled Intervention Patient ambulating two bouts of 175' with FWW and CGA. Progressing to SBA. No instability or LOB throughout. Adjusted walker height to better fit patient. Discussed use of FWW upon discharge for safety, improved balance, and decreased pain. Patient completed bout of stair  navigation x2 steps on platform step with SBA-CGA and FWW. Verbal cues and visual demonstration for proper sequencing. No instability or LOB throughout.   PT Discharge Planning   PT Plan progress gait distance, review stair navigation, standing balance, bed mobility with logroll technique.   PT Discharge Recommendation (DC Rec) home with assist   PT Rationale for DC Rec Patient below baseline functional status. Mobilizing well at initial evaluation with SBA-CGA and FWW. Lives in accessible house with spouse who can provide 24/7 supervision and assist. Recommending discharge home with supervision and assist for mobility and cares. Recommend use of FWW upon discharge.   PT Brief overview of current status supervision-SBA for all mobility   Total Session Time   Timed Code Treatment Minutes 29   Total Session Time (sum of timed and untimed services) 41

## 2022-11-23 NOTE — PROGRESS NOTES
"Progress Note    Post-operative Day: 1 Day Post-Op    Procedure(s):  LUMBAR 5 TO SACRAL 1 ANTERIOR LUMBAR INTERBODY FUSION  WITH POSTERIOR INSTRUMENTED SPINAL FUSION, POSSIBLE REMOVAL OF HARDWARE of previous posterior lumbar instrumentation lumbar 3 to lumbar 5      Subjective:    Patient seen and evaluated this morning.  Patient doing well.  Patient's pain is controlled.  Patient reports that he has been ambulating with the help of physical therapy.  Patient reports that his preoperative symptoms have improved.  He reports that walking felt better than it did in a while.  Patient has been able to tolerate p.o.  Patient Sauer just removed and has not voided.  Patient has been able to pass gas.    Objective:  Blood pressure 118/64, pulse 68, temperature 98.4  F (36.9  C), temperature source Oral, resp. rate 17, height 1.803 m (5' 11\"), weight 102.7 kg (226 lb 6.4 oz), SpO2 97 %.    Patient Vitals for the past 24 hrs:   BP Temp Temp src Pulse Resp SpO2 Height Weight   11/23/22 0402 118/64 98.4  F (36.9  C) Oral 68 17 97 % -- --   11/23/22 0015 116/60 97.9  F (36.6  C) Oral 79 16 97 % -- --   11/22/22 2100 121/76 -- -- 84 -- 96 % -- --   11/22/22 2034 132/70 97.9  F (36.6  C) Oral 80 16 95 % -- --   11/22/22 2016 (!) 143/79 97.5  F (36.4  C) Oral 82 16 -- -- --   11/22/22 1950 -- -- -- -- -- 97 % -- --   11/22/22 1940 (!) 145/77 97.1  F (36.2  C) -- 78 12 98 % -- --   11/22/22 1930 138/73 -- -- 74 10 98 % -- --   11/22/22 1920 (!) 145/73 -- -- 79 11 98 % -- --   11/22/22 1910 139/73 -- -- 78 12 98 % -- --   11/22/22 1905 -- -- -- -- -- 98 % -- --   11/22/22 1900 (!) 141/76 -- -- 74 12 -- -- --   11/22/22 1847 -- -- -- -- -- 98 % -- --   11/22/22 1840 (!) 147/73 -- -- 76 12 98 % -- --   11/22/22 1830 133/72 -- -- 74 11 97 % -- --   11/22/22 1820 (!) 143/76 -- -- 82 18 96 % -- --   11/22/22 1810 126/70 -- -- 85 12 98 % -- --   11/22/22 1807 137/69 97.5  F (36.4  C) Temporal 86 14 97 % -- --   11/22/22 1124 (!) 149/78 " "97.6  F (36.4  C) Temporal 60 14 96 % 1.803 m (5' 11\") 102.7 kg (226 lb 6.4 oz)       Wt Readings from Last 4 Encounters:   11/22/22 102.7 kg (226 lb 6.4 oz)   11/17/22 103.1 kg (227 lb 3.2 oz)     Motor function, sensation, and circulation intact yes  Wound status: incisions are clean dry and intact.  Incisions are clean dry and intact.  Suction in place-has put out 80 cc since time of surgery.  Abdomen is distended but nontender.  Sauer discontinued  Pertinent Labs   Lab Results: personally reviewed.     Recent Labs   Lab Test 11/21/22  1343 11/17/22  1349   HGB  --  15.3   HCT  --  44.5   MCV  --  89   PLT  --  192    152*       Plan:   1.Anticoagulation protocol: Ambulation and mechanical  2.Pain medications: Current regimen.  3.Weight bearing status: Weightbearing as tolerated, activity per discharge instructions.  4.Await voiding.  5.recommend a lumbar brace.  6. Awaiting postoperative radiographs.  7. Disposition to be determined.  Possible later this evening.  8. Drain removal later today.  Report completed by:  DONYA DE SOUZA MD  Date: 11/23/2022  Time: 10:00 AM  "

## 2022-11-23 NOTE — ANESTHESIA CARE TRANSFER NOTE
Patient: Bryon Meyer    Procedure: Procedure(s):  LUMBAR 5 TO SACRAL 1 ANTERIOR LUMBAR INTERBODY FUSION  WITH POSTERIOR INSTRUMENTED SPINAL FUSION, POSSIBLE REMOVAL OF HARDWARE of previous posterior lumbar instrumentation lumbar 3 to lumbar 5       Diagnosis: Degeneration of lumbar or lumbosacral intervertebral disc [M51.37]  Lumbosacral stenosis [M48.07]  Diagnosis Additional Information: No value filed.    Anesthesia Type:   General     Note:    Oropharynx: oropharynx clear of all foreign objects and spontaneously breathing  Level of Consciousness: awake  Oxygen Supplementation: face mask  Level of Supplemental Oxygen (L/min / FiO2): 6  Independent Airway: airway patency satisfactory and stable  Dentition: dentition unchanged  Vital Signs Stable: post-procedure vital signs reviewed and stable  Report to RN Given: handoff report given  Patient transferred to: PACU    Handoff Report: Identifed the Patient, Identified the Reponsible Provider, Reviewed the pertinent medical history, Discussed the surgical course, Reviewed Intra-OP anesthesia mangement and issues during anesthesia, Set expectations for post-procedure period and Allowed opportunity for questions and acknowledgement of understanding      Vitals:  Vitals Value Taken Time   /69 11/22/22 1807   Temp     Pulse 85 11/22/22 1810   Resp 12 11/22/22 1810   SpO2 98 % 11/22/22 1810   Vitals shown include unvalidated device data.    Electronically Signed By: JESUS Christianson CRNA  November 22, 2022  6:11 PM

## 2022-11-23 NOTE — PLAN OF CARE
Goal Outcome Evaluation:      Plan of Care Reviewed With: patient    Overall Patient Progress: improving    Patient is AO X 4; VSS; on EA; pain is well controlled; voiding; ambulating, tolerating diet.    Possible discharge in the PM.

## 2022-11-23 NOTE — ANESTHESIA POSTPROCEDURE EVALUATION
Patient: Bryon Meyer    Procedure: Procedure(s):  LUMBAR 5 TO SACRAL 1 ANTERIOR LUMBAR INTERBODY FUSION  WITH POSTERIOR INSTRUMENTED SPINAL FUSION, POSSIBLE REMOVAL OF HARDWARE of previous posterior lumbar instrumentation lumbar 3 to lumbar 5       Anesthesia Type:  General    Note:  Disposition: Inpatient   Postop Pain Control: Uneventful            Sign Out: Well controlled pain   PONV:    Neuro/Psych: Uneventful            Sign Out: Acceptable/Baseline neuro status   Airway/Respiratory: Uneventful            Sign Out: Acceptable/Baseline resp. status   CV/Hemodynamics: Uneventful            Sign Out: Acceptable CV status   Other NRE: NONE   DID A NON-ROUTINE EVENT OCCUR? No           Last vitals:  Vitals Value Taken Time   /73 11/22/22 1930   Temp 36.4  C (97.5  F) 11/22/22 1807   Pulse 74 11/22/22 1935   Resp 11 11/22/22 1935   SpO2 98 % 11/22/22 1935   Vitals shown include unvalidated device data.    Electronically Signed By: Lino Saucedo MD  November 22, 2022  7:36 PM

## 2022-11-23 NOTE — PROVIDER NOTIFICATION
MD Notification    Notified Person: MD    Notified Person Name: Dr. De La Fuente    Notification Date/Time: 11/23/22 1500    Notification Interaction: spoke on phone    Purpose of Notification: Discharge order and electronic signature needed    Orders Received: okay to take verbal order for discharge and he said he would put in electronic signature when he gets home in an hour otherwise all right to add to discharge.    Comments:  Patient awaiting orthotic brace-spoke with them-coming between [id number].

## 2022-11-23 NOTE — PROGRESS NOTES
Outpatient in a Bed discharge goals  PRIOR TO DISCHARGE        Discharge Goals that MUST be met PRIOR to discharge IF PATIENT IS REGISTERED AS OUTPATIENT:   - Vital signs and mental status at baseline-Met.  - Oral intake tolerated (at least 100 mLs fluid PO, at least 25% of a snack or meal, and oral intake on at least two separate occasions)-met.  - Able to ambulate at least 30 feet- met.   - Must void prior to discharge unless Sauer inserted per Neurosurgery Bladder Management Algorithm-not met-due to void,  - Adequate pain control using oral analgesics-Met.  -Hypercapnia, hypoventilation or hypoxia resolved for at least 2 hours without supplemental oxygen-Met.  - Deficits in sensation, mobility or coordination have resolved if spinal or regional anesthesia was used-met.  - Notify Provider IF patient FAILS to meet Discharge Goal criteria-not met.  - If Provider has already entered all discharge goals and/or cleared patient for discharge, RN does NOT need to notify Provider PRIOR to patient discharge.

## 2022-11-24 NOTE — PROGRESS NOTES
"11/23/22, [facility] 2403/2403-01, male, Height: 5' 11\", Weight: 226 lbs 6.4 oz, Ordered by: Dx: Acute post-operative pain, MRN #: [id number].    S:   Patient was seen today at 2403/2403-01 present for the measurement and delivery of a Breg chairback  lumbosacral orthosis (Ref # [id number]).  Patient appears pleasant.   Health History & Progression:   Patient's history of utilizing a LSO spinal orthosis: no history of utilizing orthoses for ailment being addressed today.    O:   I donned a Horizon 637 LSO on the patient and customized it to the shape of his body.    A:     The Breg 637 LSO brace modular design allows the user to adjust the amount of motion restriction to the changing needs of each patient. The tightening system provides effective compression for superior patient outcomes, regardless of patient strength. The LSO design allows one product to comfortably fit waists ranging from 26\" - 60\", without compromising effectiveness and without the need for tools.  Patient s ailment recovery in rehabilitation is expected to be managed well with the utilization of the back brace.  Upon fitting the brace the patient vocalized satisfaction with the fit and feel of the orthosis. The trimlines and widths of the brace presented satisfactory after the waist straps were set to size 4 and brace was donned.   Patient signed the delivery ticket and was given the [facility] receipt information sheet.    Goal:   The OTS spinal brace will be used to reduce pain by restricting mobility of the trunk, to facilitate healing following a surgical procedure on the spine and related soft tissue and to support weak spinal muscles.     P:  Patient was given the verbal and written instructions on how to don/doff/care for the brace. Patient will utilize the orthosis for the duration of rehabilitation/PT in the hospital and at home activities upon discharge for the " duration of treatment of patient ailment or until use of orthosis is no longer necessary or duration of the treatment or unless otherwise specified by the patient's provider. Will follow-up PRN.    Electronically signed by Dillon JAIN

## 2022-11-24 NOTE — PLAN OF CARE
Physical Therapy Discharge Summary    Reason for therapy discharge:    Discharged to home.    Progress towards therapy goal(s). See goals on Care Plan in [facility] electronic health record for goal details.  Goals partially met.  Barriers to achieving goals:   discharge from facility.    Therapy recommendation(s):    Continue home exercise program.Patient below baseline functional status. Mobilizing well at initial evaluation with supervision and FWW. Lives in accessible house with spouse who can provide 24/7 supervision and assist. Recommending discharge home with supervision and assist for mobility and cares. Recommend use of FWW upon discharge.  PT Brief overview of current status: supervision for all mobility

## 2022-12-15 NOTE — DISCHARGE SUMMARY
[facility]charge Summary    Bryon Meyer MRN# [id number]   Age: 77 year old YOB: 1945     Date of Admission:  11/22/2022  Date of Discharge::  11/23/2022  6:19 PM   Admitting Physician:  Luis De La Fuente MD  Discharge Physician:  LUIS DE LA FUENTE MD    Home clinic: [facility]          Admission Diagnoses:   Degeneration of lumbar or lumbosacral intervertebral disc [M51.37]  Lumbosacral stenosis [M48.07]  S/P lumbar fusion [Z98.1]  Acute post-operative pain [G89.18]          Discharge Diagnosis:     Patient Active Problem List   Diagnosis     Acute post-operative pain             Procedures:   ALIF and PISF at L5-S1,           Medications Prior to Admission:     No medications prior to admission.             Discharge Medications:     Discharge Medication List as of 11/23/2022  4:35 PM      START taking these medications    Details   methocarbamol (ROBAXIN) 500 MG tablet Take 1 tablet (500 mg) by mouth 4 times daily as needed for muscle spasms, Disp-60 tablet, R-0, Local Print      ondansetron (ZOFRAN) 4 MG tablet Take 1 tablet (4 mg) by mouth every 8 hours as needed for nausea, Disp-30 tablet, R-0, Local Print      oxyCODONE (ROXICODONE) 5 MG tablet Take 1 tablet (5 mg) by mouth every 6 hours as needed for pain, Disp-30 tablet, R-0, Local Print      senna-docusate (SENOKOT-S/PERICOLACE) 8.6-50 MG tablet Take 1 tablet by mouth daily, Disp-60 tablet, R-1, Local Print         CONTINUE these medications which have NOT CHANGED    Details   ASCORBIC ACID PO Take 1 tablet by mouth daily, Historical      aspirin (ASA) 81 MG chewable tablet Take 1 tablet by mouth every 24 hours, Historical      atorvastatin (LIPITOR) 40 MG tablet Take 1 tablet (40 mg) by mouth daily, Disp-90 tablet, R-0, E-Prescribe      Cholecalciferol (VITAMIN D3 PO) Take 1 tablet by mouth daily, Historical      Multiple vitamin TABS Take 1 tablet by mouth daily, Historical      Omega-3 Fatty Acids (FISH OIL) 1200 MG  capsule Take 1 capsule by mouth daily, Historical      VITAMIN E PO Take 1 tablet by mouth daily, Historical                   Consultations:   No consultations were requested during this admission          Brief History of Illness:   Please see H&P          Hospital Course:   Uneventful          Discharge Instructions and Follow-Up:     Discharge diet: Regular   Discharge activity: Activity as tolerated   Discharge follow-up:   If sutures or staples were placed, please schedule an appointment for wound check and staple/suture removal in 2-3 weeks by calling [facility] at [phone] if not already scheduled           Discharge Disposition:     Discharged to home      Attestation:  I have reviewed today's vital signs, notes, medications, labs and imaging.  Amount of time performed on this discharge summary: 10 minutes.    DONYA DE SOUZA MD

## 2022-12-24 ENCOUNTER — APPOINTMENT (OUTPATIENT)
Dept: CT IMAGING | Facility: CLINIC | Age: 77
DRG: 163 | End: 2022-12-24
Attending: EMERGENCY MEDICINE
Payer: MEDICARE

## 2022-12-24 ENCOUNTER — APPOINTMENT (OUTPATIENT)
Dept: INTERVENTIONAL RADIOLOGY/VASCULAR | Facility: CLINIC | Age: 77
DRG: 163 | End: 2022-12-24
Attending: RADIOLOGY
Payer: MEDICARE

## 2022-12-24 ENCOUNTER — HOSPITAL ENCOUNTER (INPATIENT)
Facility: CLINIC | Age: 77
LOS: 3 days | Discharge: HOME | DRG: 163 | End: 2022-12-27
Attending: EMERGENCY MEDICINE | Admitting: HOSPITALIST
Payer: MEDICARE

## 2022-12-24 ENCOUNTER — APPOINTMENT (OUTPATIENT)
Dept: ULTRASOUND IMAGING | Facility: CLINIC | Age: 77
DRG: 163 | End: 2022-12-24
Attending: EMERGENCY MEDICINE
Payer: MEDICARE

## 2022-12-24 DIAGNOSIS — R06.02 SHORTNESS OF BREATH: ICD-10-CM

## 2022-12-24 DIAGNOSIS — J96.01 ACUTE RESPIRATORY FAILURE WITH HYPOXIA (H): ICD-10-CM

## 2022-12-24 DIAGNOSIS — I82.4Y2 ACUTE DEEP VEIN THROMBOSIS (DVT) OF PROXIMAL VEIN OF LEFT LOWER EXTREMITY (H): ICD-10-CM

## 2022-12-24 DIAGNOSIS — I26.02 ACUTE SADDLE PULMONARY EMBOLISM WITH ACUTE COR PULMONALE (H): Primary | ICD-10-CM

## 2022-12-24 LAB
ACT BLD: 220 SECONDS (ref 74–150)
ACT BLD: 224 SECONDS (ref 74–150)
ACT BLD: 254 SECONDS (ref 74–150)
ALBUMIN SERPL-MCNC: 3.1 G/DL (ref 3.4–5)
ALP SERPL-CCNC: 126 U/L (ref 40–150)
ALT SERPL W P-5'-P-CCNC: 29 U/L (ref 0–70)
ANION GAP SERPL CALCULATED.3IONS-SCNC: 8 MMOL/L (ref 3–14)
AST SERPL W P-5'-P-CCNC: 30 U/L (ref 0–45)
ATRIAL RATE - MUSE: 112 BPM
BASOPHILS # BLD AUTO: 0 10E3/UL (ref 0–0.2)
BASOPHILS NFR BLD AUTO: 0 %
BILIRUB SERPL-MCNC: 1.1 MG/DL (ref 0.2–1.3)
BUN SERPL-MCNC: 9 MG/DL (ref 7–30)
CALCIUM SERPL-MCNC: 8.4 MG/DL (ref 8.5–10.1)
CHLORIDE BLD-SCNC: 107 MMOL/L (ref 94–109)
CO2 SERPL-SCNC: 24 MMOL/L (ref 20–32)
CREAT SERPL-MCNC: 1.1 MG/DL (ref 0.66–1.25)
DIASTOLIC BLOOD PRESSURE - MUSE: NORMAL MMHG
EOSINOPHIL # BLD AUTO: 0.3 10E3/UL (ref 0–0.7)
EOSINOPHIL NFR BLD AUTO: 3 %
ERYTHROCYTE [DISTWIDTH] IN BLOOD BY AUTOMATED COUNT: 13.1 % (ref 10–15)
ERYTHROCYTE [DISTWIDTH] IN BLOOD BY AUTOMATED COUNT: 13.2 % (ref 10–15)
FLUAV RNA SPEC QL NAA+PROBE: NEGATIVE
FLUBV RNA RESP QL NAA+PROBE: NEGATIVE
GFR SERPL CREATININE-BSD FRML MDRD: 70 ML/MIN/1.73M2
GLUCOSE BLD-MCNC: 217 MG/DL (ref 70–99)
GLUCOSE BLDC GLUCOMTR-MCNC: 108 MG/DL (ref 70–99)
GLUCOSE BLDC GLUCOMTR-MCNC: 112 MG/DL (ref 70–99)
HCT VFR BLD AUTO: 39.5 % (ref 40–53)
HCT VFR BLD AUTO: 40.4 % (ref 40–53)
HGB BLD-MCNC: 13 G/DL (ref 13.3–17.7)
HGB BLD-MCNC: 13.5 G/DL (ref 13.3–17.7)
HOLD SPECIMEN: NORMAL
HOLD SPECIMEN: NORMAL
IMM GRANULOCYTES # BLD: 0 10E3/UL
IMM GRANULOCYTES NFR BLD: 1 %
INTERPRETATION ECG - MUSE: NORMAL
LYMPHOCYTES # BLD AUTO: 1 10E3/UL (ref 0.8–5.3)
LYMPHOCYTES NFR BLD AUTO: 12 %
MCH RBC QN AUTO: 29.7 PG (ref 26.5–33)
MCH RBC QN AUTO: 30.1 PG (ref 26.5–33)
MCHC RBC AUTO-ENTMCNC: 32.9 G/DL (ref 31.5–36.5)
MCHC RBC AUTO-ENTMCNC: 33.4 G/DL (ref 31.5–36.5)
MCV RBC AUTO: 89 FL (ref 78–100)
MCV RBC AUTO: 91 FL (ref 78–100)
MONOCYTES # BLD AUTO: 0.4 10E3/UL (ref 0–1.3)
MONOCYTES NFR BLD AUTO: 5 %
NEUTROPHILS # BLD AUTO: 6.4 10E3/UL (ref 1.6–8.3)
NEUTROPHILS NFR BLD AUTO: 79 %
NRBC # BLD AUTO: 0 10E3/UL
NRBC BLD AUTO-RTO: 0 /100
NT-PROBNP SERPL-MCNC: 399 PG/ML (ref 0–1800)
P AXIS - MUSE: 68 DEGREES
PLATELET # BLD AUTO: 171 10E3/UL (ref 150–450)
PLATELET # BLD AUTO: 180 10E3/UL (ref 150–450)
POTASSIUM BLD-SCNC: 3.8 MMOL/L (ref 3.4–5.3)
PR INTERVAL - MUSE: 196 MS
PROCALCITONIN SERPL-MCNC: <0.05 NG/ML
PROT SERPL-MCNC: 6 G/DL (ref 6.8–8.8)
QRS DURATION - MUSE: 98 MS
QT - MUSE: 316 MS
QTC - MUSE: 431 MS
R AXIS - MUSE: 70 DEGREES
RADIOLOGIST FLAGS: ABNORMAL
RBC # BLD AUTO: 4.32 10E6/UL (ref 4.4–5.9)
RBC # BLD AUTO: 4.55 10E6/UL (ref 4.4–5.9)
RSV RNA SPEC NAA+PROBE: NEGATIVE
SARS-COV-2 RNA RESP QL NAA+PROBE: NEGATIVE
SODIUM SERPL-SCNC: 139 MMOL/L (ref 133–144)
SYSTOLIC BLOOD PRESSURE - MUSE: NORMAL MMHG
T AXIS - MUSE: -28 DEGREES
TROPONIN I SERPL HS-MCNC: 1245 NG/L
TROPONIN I SERPL HS-MCNC: 50 NG/L
UFH PPP CHRO-ACNC: >1.1 IU/ML
VENTRICULAR RATE- MUSE: 112 BPM
WBC # BLD AUTO: 7.2 10E3/UL (ref 4–11)
WBC # BLD AUTO: 8 10E3/UL (ref 4–11)

## 2022-12-24 PROCEDURE — G1010 CDSM STANSON: HCPCS

## 2022-12-24 PROCEDURE — 02CR3ZZ EXTIRPATION OF MATTER FROM LEFT PULMONARY ARTERY, PERCUTANEOUS APPROACH: ICD-10-PCS | Performed by: RADIOLOGY

## 2022-12-24 PROCEDURE — 200N000001 HC R&B ICU

## 2022-12-24 PROCEDURE — C1769 GUIDE WIRE: HCPCS

## 2022-12-24 PROCEDURE — 83605 ASSAY OF LACTIC ACID: CPT

## 2022-12-24 PROCEDURE — C9803 HOPD COVID-19 SPEC COLLECT: HCPCS

## 2022-12-24 PROCEDURE — 96366 THER/PROPH/DIAG IV INF ADDON: CPT

## 2022-12-24 PROCEDURE — 250N000011 HC RX IP 250 OP 636: Performed by: HOSPITALIST

## 2022-12-24 PROCEDURE — 99292 CRITICAL CARE ADDL 30 MIN: CPT

## 2022-12-24 PROCEDURE — 272N000209 HC BALLOON (NON-PTA) CR6

## 2022-12-24 PROCEDURE — 36415 COLL VENOUS BLD VENIPUNCTURE: CPT | Performed by: HOSPITALIST

## 2022-12-24 PROCEDURE — 80053 COMPREHEN METABOLIC PANEL: CPT | Performed by: EMERGENCY MEDICINE

## 2022-12-24 PROCEDURE — 250N000011 HC RX IP 250 OP 636: Performed by: RADIOLOGY

## 2022-12-24 PROCEDURE — 84484 ASSAY OF TROPONIN QUANT: CPT | Performed by: EMERGENCY MEDICINE

## 2022-12-24 PROCEDURE — 272N000566 HC SHEATH CR3

## 2022-12-24 PROCEDURE — C1760 CLOSURE DEV, VASC: HCPCS

## 2022-12-24 PROCEDURE — 272N000196 HC ACCESSORY CR5

## 2022-12-24 PROCEDURE — 02CQ3ZZ EXTIRPATION OF MATTER FROM RIGHT PULMONARY ARTERY, PERCUTANEOUS APPROACH: ICD-10-PCS | Performed by: RADIOLOGY

## 2022-12-24 PROCEDURE — 250N000009 HC RX 250: Performed by: EMERGENCY MEDICINE

## 2022-12-24 PROCEDURE — 83880 ASSAY OF NATRIURETIC PEPTIDE: CPT | Performed by: EMERGENCY MEDICINE

## 2022-12-24 PROCEDURE — 36415 COLL VENOUS BLD VENIPUNCTURE: CPT | Performed by: EMERGENCY MEDICINE

## 2022-12-24 PROCEDURE — 93005 ELECTROCARDIOGRAM TRACING: CPT

## 2022-12-24 PROCEDURE — 87637 SARSCOV2&INF A&B&RSV AMP PRB: CPT | Performed by: EMERGENCY MEDICINE

## 2022-12-24 PROCEDURE — 250N000011 HC RX IP 250 OP 636: Performed by: EMERGENCY MEDICINE

## 2022-12-24 PROCEDURE — 272N000116 HC CATH CR1

## 2022-12-24 PROCEDURE — 255N000002 HC RX 255 OP 636: Performed by: RADIOLOGY

## 2022-12-24 PROCEDURE — 85041 AUTOMATED RBC COUNT: CPT | Performed by: EMERGENCY MEDICINE

## 2022-12-24 PROCEDURE — C1880 VENA CAVA FILTER: HCPCS

## 2022-12-24 PROCEDURE — 06H03DZ INSERTION OF INTRALUMINAL DEVICE INTO INFERIOR VENA CAVA, PERCUTANEOUS APPROACH: ICD-10-PCS | Performed by: RADIOLOGY

## 2022-12-24 PROCEDURE — 75743 ARTERY X-RAYS LUNGS: CPT

## 2022-12-24 PROCEDURE — 96376 TX/PRO/DX INJ SAME DRUG ADON: CPT

## 2022-12-24 PROCEDURE — 76937 US GUIDE VASCULAR ACCESS: CPT

## 2022-12-24 PROCEDURE — 84484 ASSAY OF TROPONIN QUANT: CPT | Performed by: HOSPITALIST

## 2022-12-24 PROCEDURE — 250N000013 HC RX MED GY IP 250 OP 250 PS 637: Performed by: HOSPITALIST

## 2022-12-24 PROCEDURE — 258N000003 HC RX IP 258 OP 636: Performed by: EMERGENCY MEDICINE

## 2022-12-24 PROCEDURE — 96367 TX/PROPH/DG ADDL SEQ IV INF: CPT

## 2022-12-24 PROCEDURE — 84145 PROCALCITONIN (PCT): CPT | Performed by: EMERGENCY MEDICINE

## 2022-12-24 PROCEDURE — 99152 MOD SED SAME PHYS/QHP 5/>YRS: CPT

## 2022-12-24 PROCEDURE — 85027 COMPLETE CBC AUTOMATED: CPT | Performed by: HOSPITALIST

## 2022-12-24 PROCEDURE — 85347 COAGULATION TIME ACTIVATED: CPT

## 2022-12-24 PROCEDURE — 272N000194 HC ACCESSORY CR3

## 2022-12-24 PROCEDURE — 37191 INS ENDOVAS VENA CAVA FILTR: CPT

## 2022-12-24 PROCEDURE — 85520 HEPARIN ASSAY: CPT | Performed by: HOSPITALIST

## 2022-12-24 PROCEDURE — B5191ZZ FLUOROSCOPY OF INFERIOR VENA CAVA USING LOW OSMOLAR CONTRAST: ICD-10-PCS | Performed by: RADIOLOGY

## 2022-12-24 PROCEDURE — 999N000157 HC STATISTIC RCP TIME EA 10 MIN

## 2022-12-24 PROCEDURE — 250N000009 HC RX 250: Performed by: RADIOLOGY

## 2022-12-24 PROCEDURE — 82803 BLOOD GASES ANY COMBINATION: CPT

## 2022-12-24 PROCEDURE — 99223 1ST HOSP IP/OBS HIGH 75: CPT | Mod: AI | Performed by: HOSPITALIST

## 2022-12-24 PROCEDURE — 96365 THER/PROPH/DIAG IV INF INIT: CPT | Mod: 59

## 2022-12-24 PROCEDURE — 87040 BLOOD CULTURE FOR BACTERIA: CPT | Performed by: EMERGENCY MEDICINE

## 2022-12-24 PROCEDURE — 99291 CRITICAL CARE FIRST HOUR: CPT | Mod: 25

## 2022-12-24 PROCEDURE — 93970 EXTREMITY STUDY: CPT

## 2022-12-24 RX ORDER — HEPARIN SODIUM 1000 [USP'U]/ML
500-6000 INJECTION, SOLUTION INTRAVENOUS; SUBCUTANEOUS
Status: COMPLETED | OUTPATIENT
Start: 2022-12-24 | End: 2022-12-24

## 2022-12-24 RX ORDER — PROCHLORPERAZINE MALEATE 5 MG
5 TABLET ORAL EVERY 6 HOURS PRN
Status: DISCONTINUED | OUTPATIENT
Start: 2022-12-24 | End: 2022-12-27 | Stop reason: HOSPADM

## 2022-12-24 RX ORDER — NALOXONE HYDROCHLORIDE 0.4 MG/ML
0.2 INJECTION, SOLUTION INTRAMUSCULAR; INTRAVENOUS; SUBCUTANEOUS
Status: DISCONTINUED | OUTPATIENT
Start: 2022-12-24 | End: 2022-12-24 | Stop reason: HOSPADM

## 2022-12-24 RX ORDER — IOPAMIDOL 755 MG/ML
111 INJECTION, SOLUTION INTRAVASCULAR ONCE
Status: COMPLETED | OUTPATIENT
Start: 2022-12-24 | End: 2022-12-24

## 2022-12-24 RX ORDER — ACETAMINOPHEN 650 MG/1
650 SUPPOSITORY RECTAL EVERY 4 HOURS PRN
Status: DISCONTINUED | OUTPATIENT
Start: 2022-12-24 | End: 2022-12-27 | Stop reason: HOSPADM

## 2022-12-24 RX ORDER — HEPARIN SODIUM 10000 [USP'U]/100ML
0-5000 INJECTION, SOLUTION INTRAVENOUS CONTINUOUS
Status: DISCONTINUED | OUTPATIENT
Start: 2022-12-24 | End: 2022-12-27

## 2022-12-24 RX ORDER — ACETAMINOPHEN 325 MG/10.15ML
650 LIQUID ORAL EVERY 4 HOURS PRN
Status: DISCONTINUED | OUTPATIENT
Start: 2022-12-24 | End: 2022-12-27 | Stop reason: HOSPADM

## 2022-12-24 RX ORDER — NICOTINE POLACRILEX 4 MG
15-30 LOZENGE BUCCAL
Status: DISCONTINUED | OUTPATIENT
Start: 2022-12-24 | End: 2022-12-27 | Stop reason: HOSPADM

## 2022-12-24 RX ORDER — PROCHLORPERAZINE 25 MG
12.5 SUPPOSITORY, RECTAL RECTAL EVERY 12 HOURS PRN
Status: DISCONTINUED | OUTPATIENT
Start: 2022-12-24 | End: 2022-12-27 | Stop reason: HOSPADM

## 2022-12-24 RX ORDER — ACETAMINOPHEN 325 MG/1
650 TABLET ORAL EVERY 4 HOURS PRN
Status: DISCONTINUED | OUTPATIENT
Start: 2022-12-24 | End: 2022-12-27 | Stop reason: HOSPADM

## 2022-12-24 RX ORDER — HEPARIN SODIUM 200 [USP'U]/100ML
1 INJECTION, SOLUTION INTRAVENOUS CONTINUOUS PRN
Status: DISCONTINUED | OUTPATIENT
Start: 2022-12-24 | End: 2022-12-24 | Stop reason: HOSPADM

## 2022-12-24 RX ORDER — PIPERACILLIN SODIUM, TAZOBACTAM SODIUM 4; .5 G/20ML; G/20ML
4.5 INJECTION, POWDER, LYOPHILIZED, FOR SOLUTION INTRAVENOUS ONCE
Status: COMPLETED | OUTPATIENT
Start: 2022-12-24 | End: 2022-12-24

## 2022-12-24 RX ORDER — DEXTROSE MONOHYDRATE 25 G/50ML
25-50 INJECTION, SOLUTION INTRAVENOUS
Status: DISCONTINUED | OUTPATIENT
Start: 2022-12-24 | End: 2022-12-27 | Stop reason: HOSPADM

## 2022-12-24 RX ORDER — IOPAMIDOL 612 MG/ML
100 INJECTION, SOLUTION INTRAVASCULAR ONCE
Status: COMPLETED | OUTPATIENT
Start: 2022-12-24 | End: 2022-12-24

## 2022-12-24 RX ORDER — FLUMAZENIL 0.1 MG/ML
0.2 INJECTION, SOLUTION INTRAVENOUS
Status: DISCONTINUED | OUTPATIENT
Start: 2022-12-24 | End: 2022-12-24 | Stop reason: HOSPADM

## 2022-12-24 RX ORDER — ATORVASTATIN CALCIUM 40 MG/1
40 TABLET, FILM COATED ORAL DAILY
Status: DISCONTINUED | OUTPATIENT
Start: 2022-12-24 | End: 2022-12-27 | Stop reason: HOSPADM

## 2022-12-24 RX ORDER — AMOXICILLIN 250 MG
2 CAPSULE ORAL 2 TIMES DAILY PRN
Status: DISCONTINUED | OUTPATIENT
Start: 2022-12-24 | End: 2022-12-27 | Stop reason: HOSPADM

## 2022-12-24 RX ORDER — NALOXONE HYDROCHLORIDE 0.4 MG/ML
0.4 INJECTION, SOLUTION INTRAMUSCULAR; INTRAVENOUS; SUBCUTANEOUS
Status: DISCONTINUED | OUTPATIENT
Start: 2022-12-24 | End: 2022-12-24 | Stop reason: HOSPADM

## 2022-12-24 RX ORDER — AMOXICILLIN 250 MG
1 CAPSULE ORAL 2 TIMES DAILY PRN
Status: DISCONTINUED | OUTPATIENT
Start: 2022-12-24 | End: 2022-12-27 | Stop reason: HOSPADM

## 2022-12-24 RX ORDER — HEPARIN SODIUM 10000 [USP'U]/100ML
0-5000 INJECTION, SOLUTION INTRAVENOUS CONTINUOUS
Status: DISCONTINUED | OUTPATIENT
Start: 2022-12-24 | End: 2022-12-24

## 2022-12-24 RX ORDER — FENTANYL CITRATE 50 UG/ML
25-50 INJECTION, SOLUTION INTRAMUSCULAR; INTRAVENOUS EVERY 5 MIN PRN
Status: DISCONTINUED | OUTPATIENT
Start: 2022-12-24 | End: 2022-12-24 | Stop reason: HOSPADM

## 2022-12-24 RX ORDER — BISACODYL 10 MG
10 SUPPOSITORY, RECTAL RECTAL DAILY PRN
Status: DISCONTINUED | OUTPATIENT
Start: 2022-12-24 | End: 2022-12-27 | Stop reason: HOSPADM

## 2022-12-24 RX ORDER — POLYETHYLENE GLYCOL 3350 17 G/17G
17 POWDER, FOR SOLUTION ORAL DAILY PRN
Status: DISCONTINUED | OUTPATIENT
Start: 2022-12-24 | End: 2022-12-27 | Stop reason: HOSPADM

## 2022-12-24 RX ADMIN — MIDAZOLAM 1 MG: 1 INJECTION INTRAMUSCULAR; INTRAVENOUS at 13:42

## 2022-12-24 RX ADMIN — HEPARIN SODIUM 1800 UNITS/HR: 10000 INJECTION, SOLUTION INTRAVENOUS at 19:34

## 2022-12-24 RX ADMIN — FENTANYL CITRATE 50 MCG: 50 INJECTION, SOLUTION INTRAMUSCULAR; INTRAVENOUS at 13:41

## 2022-12-24 RX ADMIN — MIDAZOLAM 0.5 MG: 1 INJECTION INTRAMUSCULAR; INTRAVENOUS at 14:11

## 2022-12-24 RX ADMIN — HEPARIN SODIUM 1800 UNITS/HR: 10000 INJECTION, SOLUTION INTRAVENOUS at 10:13

## 2022-12-24 RX ADMIN — SODIUM CHLORIDE 1000 ML: 9 INJECTION, SOLUTION INTRAVENOUS at 10:08

## 2022-12-24 RX ADMIN — IOPAMIDOL 140 ML: 612 INJECTION, SOLUTION INTRAVENOUS at 15:41

## 2022-12-24 RX ADMIN — FENTANYL CITRATE 50 MCG: 50 INJECTION, SOLUTION INTRAMUSCULAR; INTRAVENOUS at 14:35

## 2022-12-24 RX ADMIN — HEPARIN SODIUM 5 BAG: 200 INJECTION, SOLUTION INTRAVENOUS at 13:30

## 2022-12-24 RX ADMIN — SODIUM CHLORIDE 71 ML: 900 INJECTION INTRAVENOUS at 09:52

## 2022-12-24 RX ADMIN — IOPAMIDOL 111 ML: 755 INJECTION, SOLUTION INTRAVENOUS at 09:52

## 2022-12-24 RX ADMIN — PIPERACILLIN AND TAZOBACTAM 4.5 G: 4; .5 INJECTION, POWDER, FOR SOLUTION INTRAVENOUS at 10:08

## 2022-12-24 RX ADMIN — MIDAZOLAM 1 MG: 1 INJECTION INTRAMUSCULAR; INTRAVENOUS at 14:35

## 2022-12-24 RX ADMIN — FENTANYL CITRATE 50 MCG: 50 INJECTION, SOLUTION INTRAMUSCULAR; INTRAVENOUS at 14:08

## 2022-12-24 RX ADMIN — ATORVASTATIN CALCIUM 40 MG: 40 TABLET, FILM COATED ORAL at 18:17

## 2022-12-24 RX ADMIN — HEPARIN SODIUM 2000 UNITS: 1000 INJECTION INTRAVENOUS; SUBCUTANEOUS at 14:41

## 2022-12-24 RX ADMIN — LIDOCAINE HYDROCHLORIDE 12 ML: 10 INJECTION, SOLUTION INFILTRATION; PERINEURAL at 14:03

## 2022-12-24 RX ADMIN — MIDAZOLAM 0.5 MG: 1 INJECTION INTRAMUSCULAR; INTRAVENOUS at 13:56

## 2022-12-24 RX ADMIN — HEPARIN SODIUM 2000 UNITS: 1000 INJECTION INTRAVENOUS; SUBCUTANEOUS at 14:11

## 2022-12-24 ASSESSMENT — ACTIVITIES OF DAILY LIVING (ADL)
ADLS_ACUITY_SCORE: 35
ADLS_ACUITY_SCORE: 21
DIFFICULTY_EATING/SWALLOWING: NO
ADLS_ACUITY_SCORE: 20
WEAR_GLASSES_OR_BLIND: YES
CHANGE_IN_FUNCTIONAL_STATUS_SINCE_ONSET_OF_CURRENT_ILLNESS/INJURY: NO
DOING_ERRANDS_INDEPENDENTLY_DIFFICULTY: NO
ADLS_ACUITY_SCORE: 35
CONCENTRATING,_REMEMBERING_OR_MAKING_DECISIONS_DIFFICULTY: NO
WALKING_OR_CLIMBING_STAIRS_DIFFICULTY: NO
TOILETING_ISSUES: NO
ADLS_ACUITY_SCORE: 35
FALL_HISTORY_WITHIN_LAST_SIX_MONTHS: YES
ADLS_ACUITY_SCORE: 21
VISION_MANAGEMENT: GLASSES
DRESSING/BATHING_DIFFICULTY: NO
NUMBER_OF_TIMES_PATIENT_HAS_FALLEN_WITHIN_LAST_SIX_MONTHS: 1
ADLS_ACUITY_SCORE: 21

## 2022-12-24 ASSESSMENT — ENCOUNTER SYMPTOMS
SHORTNESS OF BREATH: 1
LIGHT-HEADEDNESS: 1

## 2022-12-24 NOTE — ED NOTES
[facility]  ED Nurse Handoff Report    ED Chief complaint: Syncope      ED Diagnosis:   Final diagnoses:   None       Code Status: Full Code    Allergies: No Known Allergies    Patient Story: pt had back surgery, lumbar fusion on 11/22/22. Pt was getting dressed this am and bent down to put on his socks developed sudden sob, syncopal, pt didn't pass out. Ems arrival blood pressure 70/40s, tachy 120s, sats low 80s on RA. Pt alert and oriented. Lives with wife. Pt currently SR90s, received 1l/nc. sats 100% on 4l. Pt found to have massive PE, going to IR.   Focused Assessment:  See chart    Treatments and/or interventions provided: see chart  Patient's response to treatments and/or interventions: see chart    To be done/followed up on inpatient unit:  see chart    Does this patient have any cognitive concerns?: none    Activity level - Baseline/Home:  Independent  Activity Level - Current:   Total Care    Patient's Preferred language: English   Needed?: No    Isolation: None  Infection: Not Applicable  Patient tested for COVID 19 prior to admission: YES  Bariatric?: No    Vital Signs:   Vitals:    12/24/22 1005 12/24/22 1015 12/24/22 1029 12/24/22 1030   BP: 127/80  110/83 110/79   Pulse: 108 103 99 100   Resp: 14 15 19 19   Temp:       SpO2:  100% 100% 100%   Weight:       Height:           Cardiac Rhythm:Cardiac Rhythm: Sinus tachycardia    Was the PSS-3 completed:   Yes  What interventions are required if any?               Family Comments: wife and other family here  OBS brochure/video discussed/provided to patient/family: no              Name of person given brochure if not patient: none              Relationship to patient: na    For the majority of the shift this patient's behavior was Green.   Behavioral interventions performed were none.    ED NURSE PHONE NUMBER: [phone]

## 2022-12-24 NOTE — IR NOTE
Patient Name: Bryon Meyer  Medical Record Number: [id number]  Today's Date: 12/24/2022    Procedure: Pulmonary Angiogram  Proceduralist: Dr. López  Pathology present: no    Procedure Start: 1355  Procedure end: 1536  Sedation medications administered: Last Dose: 1536, Fentanyl 150 mcg, Versed 3 mg, Lidocaine 12 ml's.    Report given to: STACEY Krueger RN  : no    Other Notes: Pt will require 3 bedrest post procedure. Pt arrived to IR room 1 from ED 19. Consent reviewed. Pt denies any questions or concerns regarding procedure. Pt positioned supine and monitored per protocol. Pt tolerated procedure without any noted complications.

## 2022-12-24 NOTE — PRE-PROCEDURE
GENERAL PRE-PROCEDURE:   Procedure:  Pulmonary thrombectomy  Date/Time:  12/24/2022 1:25 PM    Verbal consent obtained?: Yes    Risks and benefits: Risks, benefits and alternatives were discussed    Consent given by:  Patient  Patient states understanding of procedure being performed: Yes    Patient's understanding of procedure matches consent: Yes    Procedure consent matches procedure scheduled: Yes    Appropriately NPO:  Yes  ASA Class:  2  Mallampati  :  Grade 1- soft palate, uvula, tonsillar pillars, and posterior pharyngeal wall visible  Lungs:  Lungs clear with good breath sounds bilaterally  Heart:  Normal heart sounds and rate  History & Physical reviewed:  History and physical reviewed and no updates needed  Statement of review:  I have reviewed the lab findings, diagnostic data, medications, and the plan for sedation

## 2022-12-24 NOTE — ED TRIAGE NOTES
Pt syncopal at home at home today bp 70/40s, HR 120s, sats low 80s on RA, recently had spinal surgery     Triage Assessment     Row Name 12/24/22 2676       Triage Assessment (Adult)    Airway WDL WDL       Respiratory WDL    Respiratory WDL rhythm/pattern       Cognitive/Neuro/Behavioral WDL    Cognitive/Neuro/Behavioral WDL WDL

## 2022-12-24 NOTE — ED PROVIDER NOTES
"  History   Chief Complaint:  Syncope       The history is provided by the patient.      Bryon Meyer is a 77 year old male with history of dyslipidemia who presents with syncope. Per EMS, the patient was transported to the ED after a syncopal episode while doing normal activities. When EMS arrived to the scene, the patient was diaphoretic, hypotensive and tachycardic. The patient does not have a history of blood clots, or heart problems. There was no evidence of seizure like activity or postictal state.     The patient reports that he was getting dressed this morning and as he was bending down to put his socks on, he became short of breath, lightheaded and had a syncopal episode. He adds that all of his symptoms \"came all at once\". He states that he does not recall if he hit his head or any other part of his body during this episode. He notes that he has been experiencing more frequent episodes of lightheadedness in the past week however, he has not had a syncopal episode before. He reports that he is also experiencing left leg and ankle swelling. He had a recent lumbar fusion on 11/22/22.    Review of Systems   Respiratory: Positive for shortness of breath.    Cardiovascular: Positive for leg swelling.   Neurological: Positive for syncope and light-headedness.   All other systems reviewed and are negative.    Allergies:  No Known Allergies    Medications:  Ascorbic acid   Aspirin 81mg   Atorvastatin   Methocarbamol   Ondansetron   Senna docusate     Past Medical History:     Prediabetes   Elevated coronary artery calcium score   Mixed hearing loss  Dyslipidemia   Erectile dysfunction    Acid reflux     Past Surgical History:    Fusion lumbar anterior one level   Hernia repair   Tonsillectomy   Laminectomy discectomy   Colon polyp      Family History:    Father- throat cancer   Mother- glaucoma, macular degeneration    Social History:  Presents with family  Presents via EMS  PCP: No Ref-Primary, Physician " "    Physical Exam     Patient Vitals for the past 24 hrs:   BP Temp Pulse Resp SpO2 Height Weight   12/24/22 1405 -- -- 90 18 98 % -- --   12/24/22 1400 114/88 -- 90 13 97 % -- --   12/24/22 1355 125/89 -- 92 12 96 % -- --   12/24/22 1350 (!) 124/91 -- 95 13 95 % -- --   12/24/22 1345 121/85 -- 95 14 97 % -- --   12/24/22 1340 (!) 141/105 -- 93 15 98 % -- --   12/24/22 1335 (!) 136/100 -- 97 17 98 % -- --   12/24/22 1320 -- -- 101 18 99 % -- --   12/24/22 1315 122/87 -- 102 18 99 % -- --   12/24/22 1311 -- -- 98 18 99 % -- --   12/24/22 1306 -- -- 99 17 99 % -- --   12/24/22 1303 -- -- 100 14 99 % -- --   12/24/22 1300 128/86 -- 103 19 99 % -- --   12/24/22 1249 -- -- 102 20 99 % -- --   12/24/22 1245 134/83 -- 102 18 99 % -- --   12/24/22 1243 -- -- 101 18 99 % -- --   12/24/22 1240 -- -- 103 21 99 % -- --   12/24/22 1239 129/83 -- 105 23 96 % -- --   12/24/22 1230 127/86 -- 101 18 -- -- --   12/24/22 1200 129/89 -- 98 -- 100 % -- --   12/24/22 1130 117/81 -- 94 -- 100 % -- --   12/24/22 1049 -- -- 102 25 99 % -- --   12/24/22 1030 110/79 -- 100 19 100 % -- --   12/24/22 1029 110/83 -- 99 19 100 % -- --   12/24/22 1015 -- -- 103 15 100 % -- --   12/24/22 1005 127/80 -- 108 14 -- -- --   12/24/22 0940 105/70 -- 113 22 96 % -- --   12/24/22 0937 117/71 -- 112 21 96 % -- --   12/24/22 0936 98/78 97.8  F (36.6  C) 114 20 96 % 1.803 m (5' 11\") 101.1 kg (222 lb 12.8 oz)     Physical Exam  General: Alert, appears well-developed and well-nourished. Cooperative.     In mild distress  HEENT:  Head:  Atraumatic  Ears:  External ears are normal  Mouth/Throat:  Oropharynx is without erythema or exudate and mucous membranes are moist.   Eyes:   Conjunctivae normal and EOM are normal. No scleral icterus.  CV:  Tachycardic rate, regular rhythm, normal heart sounds and radial pulses are 2+ and symmetric.  No murmur.  Resp:  Breath sounds are clear bilaterally    Non-labored, no retractions or accessory muscle use  GI:  Abdomen is " soft, no distension, no tenderness. No rebound or guarding.  No CVA tenderness bilaterally  MS:  Normal range of motion. Bilateral lower extremity pitting edema L(1+)>R.    Normal strength in all 4 extremities.     Back atraumatic.    No midline cervical, thoracic, or lumbar tenderness  Skin:  Warm and dry.  No rash or lesions noted.  Neuro: Alert. Normal strength.  GCS: 15  Psych:  Normal mood and affect.    Emergency Department Course   ECG  ECG results from 12/24/22   EKG 12 lead     Value    Systolic Blood Pressure     Diastolic Blood Pressure     Ventricular Rate 112    Atrial Rate 112    CO Interval 196    QRS Duration 98        QTc 431    P Axis 68    R AXIS 70    T Axis -28    Interpretation ECG      Sinus tachycardia  Incomplete right bundle branch block  Cannot rule out Inferior infarct , age undetermined  Abnormal ECG  No previous ECGs available  Confirmed by GENERATED REPORT, COMPUTER ([id number]),  Kirstie Barros ([id number]) on 12/24/2022 11:42:26 AM         Imaging:  CT Head w/o Contrast   Final Result   IMPRESSION:   1.  No CT evidence for acute intracranial process.   2.  Brain atrophy and presumed chronic microvascular ischemic changes as above.      US Lower Extremity Venous Duplex Bilateral   Final Result   IMPRESSION:   1.  Positive for large burden of left lower extremity DVT in the femoral and popliteal veins and tibioperoneal trunk.      CT Chest (PE) Abdomen Pelvis w Contrast   Final Result   Abnormal   IMPRESSION:   1.  Extensive acute pulmonary emboli, including saddle embolus involving the main pulmonary artery. Secondary signs of right heart strain with elevated RV/LV ratio and reflux of contrast into the IVC.      2.  No evidence of pulmonary infarct.      [Critical Result: New diagnosis of pulmonary embolism]      Finding was identified on 12/24/2022 10:37 AM.       Dr. Amilcar Tubbs was contacted by me on 12/24/2022 10:42 AM and verbalized understanding of the critical result.        IR Pulmonary Angiogram Bilateral    (Results Pending)   IR IVC Filter Placement    (Results Pending)     Report per radiology    Laboratory:  Labs Ordered and Resulted from Time of ED Arrival to Time of ED Departure   COMPREHENSIVE METABOLIC PANEL - Abnormal       Result Value    Sodium 139      Potassium 3.8      Chloride 107      Carbon Dioxide (CO2) 24      Anion Gap 8      Urea Nitrogen 9      Creatinine 1.10      Calcium 8.4 (*)     Glucose 217 (*)     Alkaline Phosphatase 126      AST 30      ALT 29      Protein Total 6.0 (*)     Albumin 3.1 (*)     Bilirubin Total 1.1      GFR Estimate 70     PROCALCITONIN - Normal    Procalcitonin <0.05     TROPONIN I - Normal    Troponin I High Sensitivity 50     NT PROBNP INPATIENT - Normal    N terminal Pro BNP Inpatient 399     INFLUENZA A/B & SARS-COV2 PCR MULTIPLEX - Normal    Influenza A PCR Negative      Influenza B PCR Negative      RSV PCR Negative      SARS CoV2 PCR Negative     CBC WITH PLATELETS AND DIFFERENTIAL    WBC Count 8.0      RBC Count 4.55      Hemoglobin 13.5      Hematocrit 40.4      MCV 89      MCH 29.7      MCHC 33.4      RDW 13.2      Platelet Count 171      % Neutrophils 79      % Lymphocytes 12      % Monocytes 5      % Eosinophils 3      % Basophils 0      % Immature Granulocytes 1      NRBCs per 100 WBC 0      Absolute Neutrophils 6.4      Absolute Lymphocytes 1.0      Absolute Monocytes 0.4      Absolute Eosinophils 0.3      Absolute Basophils 0.0      Absolute Immature Granulocytes 0.0      Absolute NRBCs 0.0     ROUTINE UA WITH MICROSCOPIC   ISTAT CREATININE POCT   BLOOD CULTURE   BLOOD CULTURE        Emergency Department Course:     Reviewed:  I reviewed nursing notes, vitals, past medical history and Care Everywhere    Assessments:  0932 I obtained history and examined the patient as noted above.   1041 I rechecked the patient and explained findings.   1128 I rechecked and updates the patient and family.     Consults:  1003 I spoke with  radiology regarding the patient's imaging.  1022 I spoke with Dr. López, interventional radiologist, regarding the patient.   1056 I spoke with Dr. Espinosa, hospitalist, who accepts admission     Interventions:  1008 Zosyn 4.5g IV   1008 NS 1,000mL IV   1011 Heparin 8,000 units IV  1013 Heparin 1,800 units/hr IV     Disposition:  The patient was admitted to the hospital under the care of Dr. Espinosa.     Impression & Plan   Medical Decision Making:  Patient is a 77-year-old male with a history of dyslipidemia who presents after syncopal event.  Patient is also been having progressive exertional dyspnea over the last 2 weeks.  Of note he did have a recent lumbar spine fusion in the end of November.  Patient is also had some left lower extremity swelling.  High concern that his syncopal event may have been secondary to a significant pulmonary embolism.  Patient was sent for emergent CT imaging of the chest abdomen pelvis which displayed extensive acute pulmonary emboli including a saddle embolus involving the main pulmonary artery.  There were secondary signs of right heart strain with elevated RV/LV ratio and reflux of contrast into the IVC.  Patient had copious burden within the left lower extremity as well.  On recognition of pulmonary embolism heparin orders were immediately placed.  His initial EKG showed sinus tachycardia with no signs of ischemia.  Patient was initially hypoxic requiring between 8 and 12 L/min O2 by oximask.  Although he was hypotensive prehospital he has remained normotensive while under my care.  He did receive a single liter of IV fluids.  He did have a fall but no significant head injury although given the fact that he has significant pulmonary embolism and may need emergent interventional radiology procedures I did obtain CT imaging of the head which reassuringly showed no evidence of acute intracranial hemorrhage.  Case discussed with Dr. Burgos with interventional radiology who had  recommended transfer to the IR suite for likely thrombectomy and potential guided thrombolytics.  Patient has been arranged ICU bed following IR intervention.  Case was discussed with Dr. Espinosa with adult medicine who agreed to ICU admission post IR intervention.  Family and patient were updated throughout his course while in the emergency department    Critical Care time was 65 minutes for this patient excluding procedures.    Diagnosis:    ICD-10-CM    1. Acute saddle pulmonary embolism with acute cor pulmonale (H)  I26.02       2. Shortness of breath  R06.02       3. Acute respiratory failure with hypoxia (H)  J96.01       4. Acute deep vein thrombosis (DVT) of proximal vein of left lower extremity (H)  I82.4Y2         Scribe Disclosure:  Valorie GARCIA, am serving as a scribe at 9:46 AM on 12/24/2022 to document services personally performed by Amilcar Tubbs MD based on my observations and the provider's statements to me.          Amilcar Tubbs MD  12/24/22 3217

## 2022-12-24 NOTE — H&P
"[facility]    History and Physical - Hospitalist Service       Date of Admission:  12/24/2022    Assessment & Plan      Bryon Meyer is a 77 year old male admitted on 12/24/2022.  Past history of HLD and recent lumbar spinal fusion 11/22/22 who presents with dyspnea, LLE swelling and syncope, found to have LLE DVT and saddle PE.      Syncope and acute hypoxic respiratory failure due to saddle PE  Extensive LLE DVT  * Syncopal episode 12/24 while putting on shoes.  Reporting LLE swelling for 1-1.5 weeks and progressive dyspnea x1 week.    * CT pulm angio showing extensive acute bilateral PE including saddle PE with signs of RV strain.  EKG with slight ST depression of inferolateral leads, though BNP and trop wnl  * BLE US showing DVT from left femoral vein to tibioperoneal trunk.    * hemodynamically stable though requiring 15L oxygen at arrival  * Likely provoked due to recent spine surgery (reporting minimal activity for about 2 weeks after surgery on 11/22/22).  No personal or family history of DVT.    - ED discussed with IR, plan for thrombectomy  - admit to ICU  - continue heparin gtt  - serial trop, TTE  - continuous pulse ox  - start ACE wraps to LLE tomorrow  - wean oxygen as able    Recent L5-S1 fusion 11/22/22  - PT/OT once ambulatory    HLD  - continue PTA statin         Diet:   NPO until after thrombectomy  DVT Prophylaxis: heparin gtt  Sauer Catheter: Not present  Central Lines: None  Cardiac Monitoring: None  Code Status:    Full code per patient    Clinically Significant Risk Factors Present on Admission              # Hypoalbuminemia: Lowest albumin = 3.1 g/dL at 12/24/2022  9:40 AM, will monitor as appropriate          # Obesity: Estimated body mass index is 31.07 kg/m  as calculated from the following:    Height as of this encounter: 1.803 m (5' 11\").    Weight as of this encounter: 101.1 kg (222 lb 12.8 oz).           Disposition Plan      Expected Discharge Date: " 12/26/2022                The patient's care was discussed with the Bedside Nurse, Patient and Patient's Family.    Sampson Espinosa MD  Hospitalist Service  [facility]  Securely message with the Vocera Web Console (learn more here)  Text page via [employer] Paging/Directory         ______________________________________________________________________    Chief Complaint   Syncope     History is obtained from the patient, his family who is at bedside, chart review and discussion with ED provider.      History of Present Illness   Bryon Meyer is a 77 year old male who presents with syncope.  He bent to put on his shoes today and lost consciousness.  This was not witnessed, but his wife heard him fall in the next room and checked on him immediately.  He was reportedly in and out of consciousness for several minutes before arousing.  He was reportedly short of breath, diaphoretic and pale per family.  He denies any preceding chest pain/pressure or palpitations.  He notes progressive dyspnea for the past 1 week and left lower extremity swelling for the past 1-1.5 weeks, though denies pain or redness.   Denies focal neurologic symptoms.  He continues to feel short of breath in the ED and thinks he would be lightheaded if he sat up, but denies any chest pain/pressure or other complaints.  He reports he was minimally active for about 2 weeks following his recent spine surgery.  No personal or family history of DVT.      Review of Systems    The 10 point Review of Systems is negative other than noted in the HPI or here.     Past Medical History    I have reviewed this patient's medical history and updated it with pertinent information if needed.   Past Medical History:   Diagnosis Date     Acute deep vein thrombosis (DVT) of left lower extremity (H)      Acute saddle pulmonary embolism with acute cor pulmonale (H) 12/24/2022     Hyperlipidemia LDL goal <160        Past Surgical History   I have reviewed  this patient's surgical history and updated it with pertinent information if needed.  Past Surgical History:   Procedure Laterality Date     COLONOSCOPY  2019     FUSION LUMBAR ANTERIOR ONE LEVEL N/A 11/22/2022    Procedure: LUMBAR 5 TO SACRAL 1 ANTERIOR LUMBAR INTERBODY FUSION;  Surgeon: Luis De La Fuente MD;  Location: [facility] OR     FUSION, SPINE, LUMBAR, 2 LEVELS, POSTERIOR APPROACH, ROBOTIC-ASSISTED N/A 11/22/2022    Procedure: WITH POSTERIOR INSTRUMENTED SPINAL FUSION, POSSIBLE REMOVAL OF HARDWARE of previous posterior lumbar instrumentation lumbar 3 to lumbar 5;  Surgeon: Luis De La Fuente MD;  Location: [facility] OR     HERNIA REPAIR  2012     ORTHOPEDIC SURGERY  2018    lumbar  back       Social History   I have reviewed this patient's social history and updated it with pertinent information if needed.  Social History     Tobacco Use     Smoking status: Never     Smokeless tobacco: Never   Vaping Use     Vaping Use: Never used   Substance Use Topics     Alcohol use: Not Currently     Drug use: Not Currently       Family History   I have reviewed this patient's family history and updated it with pertinent information if needed.  Family History   Problem Relation Age of Onset     Other - See Comments Mother         101 yo     Throat cancer Father 78     Other Cancer Sister         COPD  Smoker     Other Cancer Brother         prostate       Prior to Admission Medications   Prior to Admission Medications   Prescriptions Last Dose Informant Patient Reported? Taking?   ASCORBIC ACID PO 12/23/2022 Self Yes Yes   Sig: Take 1 tablet by mouth daily   Cholecalciferol (VITAMIN D3 PO) 12/23/2022 Self Yes Yes   Sig: Take 1 tablet by mouth daily   Multiple vitamin TABS 12/23/2022 Self Yes Yes   Sig: Take 1 tablet by mouth daily   Omega-3 Fatty Acids (FISH OIL) 1200 MG capsule 12/23/2022 Self Yes Yes   Sig: Take 1 capsule by mouth daily   aspirin (ASA) 81 MG chewable tablet 12/23/2022 Self Yes Yes   Sig: Take 1 tablet by mouth every 24 hours    atorvastatin (LIPITOR) 40 MG tablet 12/23/2022 Self No Yes   Sig: Take 1 tablet (40 mg) by mouth daily   methocarbamol (ROBAXIN) 500 MG tablet Not Taking Self No No   Sig: Take 1 tablet (500 mg) by mouth 4 times daily as needed for muscle spasms   Patient not taking: Reported on 12/24/2022   ondansetron (ZOFRAN) 4 MG tablet Not Taking Self No No   Sig: Take 1 tablet (4 mg) by mouth every 8 hours as needed for nausea   Patient not taking: Reported on 12/24/2022   senna-docusate (SENOKOT-S/PERICOLACE) 8.6-50 MG tablet 12/4/2022 Self No Yes   Sig: Take 1 tablet by mouth daily      Facility-Administered Medications: None     Allergies   No Known Allergies    Physical Exam   Vital Signs: Temp: 97.8  F (36.6  C)   BP: 128/86 Pulse: 98   Resp: 18 SpO2: 99 % O2 Device: Oxymask Oxygen Delivery: 4 LPM  Weight: 222 lbs 12.8 oz    General Appearance: well nourished male in NAD  Eyes: PERRL, sclera anicteric   HEENT: mucous membranes moist, no neck LAD  Respiratory: lungs CTAB, no wheezes or crackles no tachypnea  Cardiovascular: RRR, normal s1/s2 without murmur  GI: abdomen soft, normal bowel sounds, nontender, nondistended  Lymph/Hematologic: 1+ RLE edema, 2+ LLE edema   Skin: no rash or bruising   Musculoskeletal: extremities warm and well perfused  Neurologic: alert and appropriate, cranial nerves grossly intact   Psychiatric: normal affect     Data   Data reviewed today: I reviewed all medications, new labs and imaging results over the last 24 hours. I personally reviewed the EKG tracing showing slight ST depression of inferolateral leads and the chest CT image(s) showing large bilateral PE with saddle embolus.    Recent Labs   Lab 12/24/22  0940   WBC 8.0   HGB 13.5   MCV 89         POTASSIUM 3.8   CHLORIDE 107   CO2 24   BUN 9   CR 1.10   ANIONGAP 8   FAINA 8.4*   *   ALBUMIN 3.1*   PROTTOTAL 6.0*   BILITOTAL 1.1   ALKPHOS 126   ALT 29   AST 30     Recent Results (from the past 24 hour(s))   CT Chest  (PE) Abdomen Pelvis w Contrast   Result Value    Radiologist flags New diagnosis of pulmonary embolism (AA)    Narrative    EXAM: CT CHEST PE ABDOMEN PELVIS W CONTRAST  LOCATION: [facility]  DATE/TIME: 12/24/2022 10:08 AM    INDICATION: Syncope, shortness of breath, hypoxia, concern for PE sepsis  COMPARISON: CT abdomen 10/18/2022  TECHNIQUE: CT chest pulmonary angiogram and routine CT abdomen pelvis with IV contrast. Arterial phase through the chest and venous phase through the abdomen and pelvis. Multiplanar reformats and MIP reconstructions were performed. Dose reduction   techniques were used.   CONTRAST: 111mL Isovue 370    FINDINGS:  ANGIOGRAM CHEST: Extensive bilateral acute pulmonary emboli, including a large saddle embolus involving the main pulmonary artery. Thoracic aorta is negative for dissection. RV/LV ratio abnormal, greater than 1.0.     LUNGS AND PLEURA: Fainted tree-in-bud opacities in the lower lobes bilaterally, unchanged compared to 10/18/2022.    MEDIASTINUM/AXILLAE: No lymphadenopathy. No thoracic aortic aneurysms.    CORONARY ARTERY CALCIFICATION: Mild.    HEPATOBILIARY: No significant mass or bile duct dilatation. No calcified gallstones. Unchanged tiny cyst in the right lobe.    PANCREAS: Normal.    SPLEEN: Normal.    ADRENAL GLANDS: Normal.    KIDNEYS/BLADDER: No significant mass, stones, or hydronephrosis. There are simple or benign cysts. No follow up is needed. Bladder unremarkable.    BOWEL: Diverticulosis of the colon. No acute inflammatory change. No obstruction. Normal appendix.    LYMPH NODES: No enlarged lymph nodes. 4.4 cm simple fluid collection along the left external iliac region suggestive of lymphocele or seroma (12/196).    VASCULATURE: Mild atherosclerotic calcification. No aneurysm.    PELVIC ORGANS: Normal.    MUSCULOSKELETAL: Posterior fusion hardware in the lumbosacral spine. Mild degenerative changes elsewhere in the spine. No aggressive or  destructive lesion. Healed right rib fractures. Postsurgical changes in the anterior abdominal wall.      Impression    IMPRESSION:  1.  Extensive acute pulmonary emboli, including saddle embolus involving the main pulmonary artery. Secondary signs of right heart strain with elevated RV/LV ratio and reflux of contrast into the IVC.    2.  No evidence of pulmonary infarct.    [Critical Result: New diagnosis of pulmonary embolism]    Finding was identified on 12/24/2022 10:37 AM.     Dr. Amilcar Tubbs was contacted by me on 12/24/2022 10:42 AM and verbalized understanding of the critical result.    US Lower Extremity Venous Duplex Bilateral    Narrative    EXAM: US LOWER EXTREMITY VENOUS DUPLEX BILATERAL  LOCATION: [facility]  DATE/TIME: 12/24/2022 10:59 AM    INDICATION: BL lower leg swelling, L>R, post op several weeks from L spine fusion  COMPARISON: None.  TECHNIQUE: Venous Duplex ultrasound of bilateral lower extremities with and without compression, augmentation and duplex. Color flow and spectral Doppler with waveform analysis performed.    FINDINGS: Exam includes the common femoral, femoral, popliteal veins as well as segmentally visualized deep calf veins and greater saphenous vein.     RIGHT: No deep vein thrombosis. No superficial thrombophlebitis. No popliteal cyst.    LEFT: Nonocclusive thrombus in the femoral vein in the upper and mid thigh and occlusive thrombus in the femoral vein in the distal thigh. Nonocclusive thrombus in the popliteal vein and tibioperoneal trunk. The posterior tibial and peroneal veins of the   calf are patent and negative for thrombus. No superficial thrombophlebitis. No popliteal cyst.      Impression    IMPRESSION:  1.  Positive for large burden of left lower extremity DVT in the femoral and popliteal veins and tibioperoneal trunk.   CT Head w/o Contrast    Narrative    EXAM: CT HEAD W/O CONTRAST  LOCATION: [facility]  DATE/TIME: 12/24/2022 11:42 AM    INDICATION: Syncope, head injury.  COMPARISON: None.  TECHNIQUE: Routine CT Head without IV contrast. Multiplanar reformats. Dose reduction techniques were used.    FINDINGS:  INTRACRANIAL CONTENTS: There is intravenous contrast on board from a recent chest CT. No intracranial hemorrhage, extraaxial collection, or mass effect.  No CT evidence of acute infarct. Mild presumed chronic small vessel ischemic changes. Mild   generalized volume loss. No hydrocephalus.     VISUALIZED ORBITS/SINUSES/MASTOIDS: No intraorbital abnormality. No paranasal sinus mucosal disease. No middle ear or mastoid effusion.    BONES/SOFT TISSUES: No acute abnormality.      Impression    IMPRESSION:  1.  No CT evidence for acute intracranial process.  2.  Brain atrophy and presumed chronic microvascular ischemic changes as above.

## 2022-12-24 NOTE — IR NOTE
IR PULMONARY ANGIOGRAM BILATERAL   12/24/2022 3:40 PM     CLINICAL HISTORY/INDICATION: submassive pe, syncope, high risk    PROCEDURES PERFORMED:   1. Bilateral pulmonary angiography.  2. Bilateral pulmonary artery thrombectomy.  3. IVC filter placement    MEDICATIONS: 12 mL 1% lidocaine, 3 mg Versed IV, 150 mcg Fentanyl IV 4000 units heparin    CONTRAST: 140    FLUORO: 22.4 minutes    AIR KERMA: 565.0 to mGy    CONSENT: Following a discussion of the risks, benefits, indications and alternatives to treatment, appropriate informed consent was obtained.      TIMEOUT: A timeout was performed per universal protocol policy to ensure correct patient, site, and procedure to be performed.     PROCEDURE AND FINDINGS:  Following a discussion of the risks, benefits, indications, and alternatives to treatment, appropriate informed consent was obtained from the patient. The patient was brought to the interventional radiology suite and placed supine on the table. Bilateral groins were prepped and draped in a sterile fashion.  A timeout was performed per universal protocol policy to confirm the correct patient, site and procedure to be performed.    A preliminary ultrasound of the right  groin was performed and demonstrates a patent right common femoral vein. A permanent ultrasound image was recorded.  Using a combination of fluoroscopy and ultrasound, an access site was determined.  The overlying skin was anesthetized with 1% Lidocaine.  Using ultrasound guidance,  access into the right common femoral vein was obtained with visualization of needle entry into the vessel. A 0.018 wire was advanced through the needle and exchange was made for a 5 [place] micropuncture sheath. A 0.035 wire was advanced through the micropuncture sheath and exchange was made for a 6 [place] sheath and subsequently a 24 [place] sheath.  A total of 4000 Units Heparin IV was administered to maintain an ACT of 250-300.     A pigtail was passed across the  right atrium into the right ventricle and subsequently into the main pulmonary artery. The pigtail was replaced with a 5 American angiographic catheter and Bentson wire which were maneuvered into a distal right lower lobe pulmonary arterial branch. A stiff wire was placed. The 5 American angiographic catheter was removed. A 24 American FloTriever device was then passed into the right pulmonary artery centrally. Digital subtraction angiography was performed, images showed large amount of thrombus. The FloTriever was then advanced into the right  main pulmonary artery to the level of the embolus and mechanical thrombectomy was performed with removal of large amount of pulmonary embolus. Post aspiration angiography was performed, images showed improvement in flow in the right pulmonary arterial tree.    The FloTriever was brought back into the main pulmonary artery. a 5 American angiographic catheter and Bentson wire which were maneuvered into a distal left lower lobe pulmonary arterial branch. A stiff wire was placed. The 5 American angiographic catheter was removed. A 24 American FloTriever device was then passed into the left pulmonary artery centrally. Digital subtraction angiography was performed, images showed large amount of thrombus. The FloTriever was then advanced into the left main pulmonary artery to the level of the embolus and mechanical thrombectomy was performed with removal of origin of of pulmonary embolus. Post aspiration angiography was performed, images showed improvement in flow in the left pulmonary arterial tree.    Because the patient has known left lower extremity DVT extending into the proximal superficial femoral vein, it was elected to place an IVC filter to minimize risk of additional future pulmonary emboli. An 8.5 American sheath was maneuvered through the existing in a 24 American sheath into the IVC. An IVC venogram was performed. The renal vein inflow was identified. Subsequently, a do not lead  retrievable filter was deployed with apex at the renal vein inflow. A fluoroscopic image was saved to document position of the filter. All catheters and wires were removed. The puncture site was closed with 2 probable glide suture mediated closure devices. This was supplemented with a pursestring suture.     Throughout the procedure, the patient was monitored by a radiology nurse for cardiac rhythm and oxygen saturation which remained stable. Total moderate sedation time was 22.4 min. The patient tolerated the procedure well and left interventional radiology in stable condition.    IMPRESSION: Bilateral pulmonary thrombectomy and IVC filter placed as above.

## 2022-12-24 NOTE — PHARMACY-ADMISSION MEDICATION HISTORY
Pharmacy Medication History  Admission medication history interview status for the 12/24/2022  admission is complete. See EPIC admission navigator for prior to admission medications     Location of Interview: Patient room  Medication history sources: Patient, Patient's family/friend (SAMIR, Daughter, Wife ) and Surescripts    Significant changes made to the medication list:    Added:   NA     Changed:   NA     Deleted:   Vitamin E     Additional medication history information:   Reviewed sure scripts prior to speaking to the patient. The family was in the room, who assisted with med recc. They brought in a med list. The patient notes that he has restarted all of his medications, with the exception of vitamin E which was stopped.     Medication reconciliation completed by provider prior to medication history? No    Time spent in this activity: 15 minutes     Prior to Admission medications    Medication Sig Last Dose Taking? Auth Provider Long Term End Date   ASCORBIC ACID PO Take 1 tablet by mouth daily 12/23/2022 Yes Reported, Patient     aspirin (ASA) 81 MG chewable tablet Take 1 tablet by mouth every 24 hours 12/23/2022 Yes Reported, Patient     atorvastatin (LIPITOR) 40 MG tablet Take 1 tablet (40 mg) by mouth daily 12/23/2022 Yes Cyndi Lovelace PA-C Yes    Cholecalciferol (VITAMIN D3 PO) Take 1 tablet by mouth daily 12/23/2022 Yes Reported, Patient     Multiple vitamin TABS Take 1 tablet by mouth daily 12/23/2022 Yes Reported, Patient     Omega-3 Fatty Acids (FISH OIL) 1200 MG capsule Take 1 capsule by mouth daily 12/23/2022 Yes Reported, Patient     senna-docusate (SENOKOT-S/PERICOLACE) 8.6-50 MG tablet Take 1 tablet by mouth daily 12/4/2022 Yes Luis De La Fuente MD     methocarbamol (ROBAXIN) 500 MG tablet Take 1 tablet (500 mg) by mouth 4 times daily as needed for muscle spasms  Patient not taking: Reported on 12/24/2022 Not Taking  Luis De La Fuente MD     ondansetron (ZOFRAN) 4 MG tablet Take 1 tablet (4 mg) by  mouth every 8 hours as needed for nausea  Patient not taking: Reported on 12/24/2022 Not Taking  Luis De La Fuente MD         The information provided in this note is only as accurate as the sources available at the time of update(s) '

## 2022-12-24 NOTE — ED NOTES
Bed: ST  Expected date:   Expected time:   Means of arrival:   Comments:  854    77 M poss inferior stemi/pe/hypotensive/diaphoretic/low sats  0917

## 2022-12-25 ENCOUNTER — HEALTH MAINTENANCE LETTER (OUTPATIENT)
Age: 77
End: 2022-12-25

## 2022-12-25 ENCOUNTER — APPOINTMENT (OUTPATIENT)
Dept: CARDIOLOGY | Facility: CLINIC | Age: 77
DRG: 163 | End: 2022-12-25
Attending: HOSPITALIST
Payer: MEDICARE

## 2022-12-25 LAB
ANION GAP SERPL CALCULATED.3IONS-SCNC: 7 MMOL/L (ref 3–14)
BUN SERPL-MCNC: 10 MG/DL (ref 7–30)
CALCIUM SERPL-MCNC: 8.1 MG/DL (ref 8.5–10.1)
CHLORIDE BLD-SCNC: 110 MMOL/L (ref 94–109)
CO2 SERPL-SCNC: 23 MMOL/L (ref 20–32)
CREAT SERPL-MCNC: 0.75 MG/DL (ref 0.66–1.25)
ERYTHROCYTE [DISTWIDTH] IN BLOOD BY AUTOMATED COUNT: 13.2 % (ref 10–15)
ERYTHROCYTE [DISTWIDTH] IN BLOOD BY AUTOMATED COUNT: 13.2 % (ref 10–15)
GFR SERPL CREATININE-BSD FRML MDRD: >90 ML/MIN/1.73M2
GLUCOSE BLD-MCNC: 92 MG/DL (ref 70–99)
GLUCOSE BLDC GLUCOMTR-MCNC: 74 MG/DL (ref 70–99)
GLUCOSE BLDC GLUCOMTR-MCNC: 87 MG/DL (ref 70–99)
GLUCOSE BLDC GLUCOMTR-MCNC: 89 MG/DL (ref 70–99)
GLUCOSE BLDC GLUCOMTR-MCNC: 89 MG/DL (ref 70–99)
GLUCOSE BLDC GLUCOMTR-MCNC: 97 MG/DL (ref 70–99)
HCO3 BLDV-SCNC: 27 MMOL/L (ref 21–28)
HCT VFR BLD AUTO: 34.5 % (ref 40–53)
HCT VFR BLD AUTO: 36.2 % (ref 40–53)
HGB BLD-MCNC: 11.7 G/DL (ref 13.3–17.7)
HGB BLD-MCNC: 12.6 G/DL (ref 13.3–17.7)
LACTATE BLD-SCNC: 2.4 MMOL/L
LVEF ECHO: NORMAL
MCH RBC QN AUTO: 29.3 PG (ref 26.5–33)
MCH RBC QN AUTO: 30 PG (ref 26.5–33)
MCHC RBC AUTO-ENTMCNC: 32.3 G/DL (ref 31.5–36.5)
MCHC RBC AUTO-ENTMCNC: 33.9 G/DL (ref 31.5–36.5)
MCV RBC AUTO: 89 FL (ref 78–100)
MCV RBC AUTO: 91 FL (ref 78–100)
PCO2 BLDV: 46 MM HG (ref 40–50)
PH BLDV: 7.37 [PH] (ref 7.32–7.43)
PLATELET # BLD AUTO: 168 10E3/UL (ref 150–450)
PLATELET # BLD AUTO: 175 10E3/UL (ref 150–450)
PO2 BLDV: <15 MM HG (ref 25–47)
POTASSIUM BLD-SCNC: 3.7 MMOL/L (ref 3.4–5.3)
RBC # BLD AUTO: 3.9 10E6/UL (ref 4.4–5.9)
RBC # BLD AUTO: 4 10E6/UL (ref 4.4–5.9)
SAO2 % BLDV: ABNORMAL %
SODIUM SERPL-SCNC: 140 MMOL/L (ref 133–144)
TROPONIN I SERPL HS-MCNC: 627 NG/L
UFH PPP CHRO-ACNC: 0.15 IU/ML
UFH PPP CHRO-ACNC: <0.1 IU/ML
WBC # BLD AUTO: 5.5 10E3/UL (ref 4–11)
WBC # BLD AUTO: 5.6 10E3/UL (ref 4–11)

## 2022-12-25 PROCEDURE — 85520 HEPARIN ASSAY: CPT | Performed by: INTERNAL MEDICINE

## 2022-12-25 PROCEDURE — 85014 HEMATOCRIT: CPT | Performed by: HOSPITALIST

## 2022-12-25 PROCEDURE — 250N000011 HC RX IP 250 OP 636: Performed by: INTERNAL MEDICINE

## 2022-12-25 PROCEDURE — 99233 SBSQ HOSP IP/OBS HIGH 50: CPT | Performed by: INTERNAL MEDICINE

## 2022-12-25 PROCEDURE — 93306 TTE W/DOPPLER COMPLETE: CPT | Mod: 26 | Performed by: INTERNAL MEDICINE

## 2022-12-25 PROCEDURE — 80048 BASIC METABOLIC PNL TOTAL CA: CPT | Performed by: HOSPITALIST

## 2022-12-25 PROCEDURE — C9113 INJ PANTOPRAZOLE SODIUM, VIA: HCPCS | Performed by: HOSPITALIST

## 2022-12-25 PROCEDURE — 200N000001 HC R&B ICU

## 2022-12-25 PROCEDURE — 36415 COLL VENOUS BLD VENIPUNCTURE: CPT | Performed by: INTERNAL MEDICINE

## 2022-12-25 PROCEDURE — 36415 COLL VENOUS BLD VENIPUNCTURE: CPT | Performed by: HOSPITALIST

## 2022-12-25 PROCEDURE — 84484 ASSAY OF TROPONIN QUANT: CPT | Performed by: INTERNAL MEDICINE

## 2022-12-25 PROCEDURE — 250N000013 HC RX MED GY IP 250 OP 250 PS 637: Performed by: HOSPITALIST

## 2022-12-25 PROCEDURE — 85027 COMPLETE CBC AUTOMATED: CPT | Performed by: INTERNAL MEDICINE

## 2022-12-25 PROCEDURE — 85018 HEMOGLOBIN: CPT | Performed by: INTERNAL MEDICINE

## 2022-12-25 PROCEDURE — 93306 TTE W/DOPPLER COMPLETE: CPT

## 2022-12-25 PROCEDURE — 250N000011 HC RX IP 250 OP 636: Performed by: HOSPITALIST

## 2022-12-25 RX ORDER — HEPARIN SODIUM 10000 [USP'U]/100ML
0-5000 INJECTION, SOLUTION INTRAVENOUS CONTINUOUS
Status: DISCONTINUED | OUTPATIENT
Start: 2022-12-25 | End: 2022-12-26

## 2022-12-25 RX ADMIN — HEPARIN SODIUM 1200 UNITS/HR: 10000 INJECTION, SOLUTION INTRAVENOUS at 12:03

## 2022-12-25 RX ADMIN — ATORVASTATIN CALCIUM 40 MG: 40 TABLET, FILM COATED ORAL at 08:54

## 2022-12-25 RX ADMIN — PANTOPRAZOLE SODIUM 40 MG: 40 INJECTION, POWDER, FOR SOLUTION INTRAVENOUS at 08:54

## 2022-12-25 ASSESSMENT — ACTIVITIES OF DAILY LIVING (ADL)
ADLS_ACUITY_SCORE: 24
ADLS_ACUITY_SCORE: 24
ADLS_ACUITY_SCORE: 22
ADLS_ACUITY_SCORE: 22
ADLS_ACUITY_SCORE: 24
ADLS_ACUITY_SCORE: 24
ADLS_ACUITY_SCORE: 22
ADLS_ACUITY_SCORE: 24
ADLS_ACUITY_SCORE: 24
ADLS_ACUITY_SCORE: 22
ADLS_ACUITY_SCORE: 24
ADLS_ACUITY_SCORE: 22

## 2022-12-25 NOTE — PROGRESS NOTES
[facility]    Medicine Progress Note - Hospitalist Service    Date of Admission:  12/24/2022    Assessment & Plan   Bryon Meyer is a 77 year old male admitted on 12/24/2022.  Past history of HLD and recent lumbar spinal fusion 11/22/22 who presents with dyspnea, LLE swelling and syncope, found to have LLE DVT and saddle PE.        Syncope and acute hypoxic respiratory failure due to saddle PE  Extensive LLE DVT  * Syncopal episode 12/24 while putting on shoes.  Reporting LLE swelling for 1-1.5 weeks and progressive dyspnea x1 week.    * CT pulm angio showing extensive acute bilateral PE including saddle PE with signs of RV strain.  EKG with slight ST depression of inferolateral leads, though BNP and trop wnl  * BLE US showing DVT from left femoral vein to tibioperoneal trunk.    * hemodynamically stable though requiring 15L oxygen at arrival  * Likely provoked due to recent spine surgery (reporting minimal activity for about 2 weeks after surgery on 11/22/22).  No personal or family history of DVT.  -Status post mechanical thrombectomy and IVC placement for extensive left lower extremity DVT on 12/24  -Patient was started on heparin drip, however due to hematuria(see below) heparin drip currently on hold  -Will give low-dose heparin challenge and see how he does  -Continue to monitor in ICU until hemodynamically stable     Non-ST relation MI, likely type II secondary to right heart strain  -Patient currently denies any chest pain.  Admission EKG with no acute ST or T wave changes  -Recheck troponin  -Awaiting echo results to see if there is any wall motion abnormality    Gross hematuria  Urinary retention  Acute blood loss anemia likely from hematuria  Patient had urinary retention after IR procedure after which straight cath was tried.  After straight cath patient started having gross hematuria  -Heparin drip has since been discontinued  -Hemoglobin dropped from 13-11.7 this  "morning  -Patient has been able to void on his own, per patient his urine is less dark still gross blood present and there is no evidence of blood clot per nursing  -So far patient has not had any retention and is able to void on his own  -Discussed with on-call urology Dr. Luna, will give a trial of heparin drip.  -Every 6 hemoglobin check  -If hematuria gets worse, he will need three-way CBI  -Until hemodynamically stable from hematuria perspective and until evaluated by urology will keep him n.p.o.     Recent L5-S1 fusion 11/22/22  - PT/OT once ambulatory     HLD  - continue PTA statin        Diet: Advance Diet as Tolerated: Clear Liquid Diet    DVT Prophylaxis: Heparin drip as above  Sauer Catheter: Not present  Central Lines: PRESENT     Cardiac Monitoring: ACTIVE order. Indication: ICU  Code Status: Full Code      Disposition Plan     Expected Discharge Date: 12/26/2022                The patient's care was discussed with the Bedside Nurse, Patient and Urology Consultant.  Also called the wife to update and questions answered.    Rachael Luther MD  Hospitalist Service  [facility]  Securely message with the Vocera Web Console (learn more here)  Text page via [employer] Paging/Directory         Clinically Significant Risk Factors Present on Admission              # Hypoalbuminemia: Lowest albumin = 3.1 g/dL at 12/24/2022  9:40 AM, will monitor as appropriate          # Obesity: Estimated body mass index is 31.06 kg/m  as calculated from the following:    Height as of this encounter: 1.803 m (5' 11\").    Weight as of this encounter: 101 kg (222 lb 10.6 oz).           ______________________________________________________________________    Interval History   Patient denied any new complaints.  Still having hematuria but patient feels the color is better compared to last night.  No clot has been noted by patient or nursing staff.  He also had bleeding on his right groin at catheter insertion " site.  Denies any chest pain, shortness of breath or dizziness.  Called wife to update and questions answered.  She was very upset about hematuria and events overnight.    Data reviewed today: I reviewed all medications, new labs and imaging results over the last 24 hours.    Physical Exam   Vital Signs: Temp: 98  F (36.7  C) Temp src: Oral BP: 109/71 Pulse: 78   Resp: 15 SpO2: 93 % O2 Device: Oxymask Oxygen Delivery: 4 LPM  Weight: 222 lbs 10.63 oz  Exam:  Constitutional: Awake, alert and no distress. Appears comfortable  Head: Normocephalic. No masses, lesions, tenderness or abnormalities  ENT: ENT exam normal, no neck nodes or sinus tenderness  Cardiovascular: RRR.  No murmurs, no rubs or JVD  Respiratory: Normal WOB,b/l equal air entry, no wheezes or crackles   Gastrointestinal: Abdomen soft, non-tender. BS normal. No masses, organomegaly  : Deferred  Extremities : No edema , no bruit on right groin, no evidence of bruise/hematoma.  Small amount of blood in the dressing.  No clubbing or cyanosis    Neurologic: Cranial nerves II-XII grossly intact , power symmetrical, Reflexes normal and symmetric. Sensation grossly WNL.      Data   Recent Labs   Lab 12/25/22  0524 12/25/22  0432 12/25/22  0011 12/24/22  1935 12/24/22  1721 12/24/22  1659 12/24/22  0940   WBC 5.6  --   --   --  7.2  --  8.0   HGB 11.7*  --   --   --  13.0*  --  13.5   MCV 91  --   --   --  91  --  89     --   --   --  180  --  171     --   --   --   --   --  139   POTASSIUM 3.7  --   --   --   --   --  3.8   CHLORIDE 110*  --   --   --   --   --  107   CO2 23  --   --   --   --   --  24   BUN 10  --   --   --   --   --  9   CR 0.75  --   --   --   --   --  1.10   ANIONGAP 7  --   --   --   --   --  8   FAINA 8.1*  --   --   --   --   --  8.4*   GLC 92 87 89   < >  --    < > 217*   ALBUMIN  --   --   --   --   --   --  3.1*   PROTTOTAL  --   --   --   --   --   --  6.0*   BILITOTAL  --   --   --   --   --   --  1.1   ALKPHOS  --   --    --   --   --   --  126   ALT  --   --   --   --   --   --  29   AST  --   --   --   --   --   --  30    < > = values in this interval not displayed.     Recent Results (from the past 24 hour(s))   CT Chest (PE) Abdomen Pelvis w Contrast   Result Value    Radiologist flags New diagnosis of pulmonary embolism (AA)    Narrative    EXAM: CT CHEST PE ABDOMEN PELVIS W CONTRAST  LOCATION: [facility]  DATE/TIME: 12/24/2022 10:08 AM    INDICATION: Syncope, shortness of breath, hypoxia, concern for PE sepsis  COMPARISON: CT abdomen 10/18/2022  TECHNIQUE: CT chest pulmonary angiogram and routine CT abdomen pelvis with IV contrast. Arterial phase through the chest and venous phase through the abdomen and pelvis. Multiplanar reformats and MIP reconstructions were performed. Dose reduction   techniques were used.   CONTRAST: 111mL Isovue 370    FINDINGS:  ANGIOGRAM CHEST: Extensive bilateral acute pulmonary emboli, including a large saddle embolus involving the main pulmonary artery. Thoracic aorta is negative for dissection. RV/LV ratio abnormal, greater than 1.0.     LUNGS AND PLEURA: Fainted tree-in-bud opacities in the lower lobes bilaterally, unchanged compared to 10/18/2022.    MEDIASTINUM/AXILLAE: No lymphadenopathy. No thoracic aortic aneurysms.    CORONARY ARTERY CALCIFICATION: Mild.    HEPATOBILIARY: No significant mass or bile duct dilatation. No calcified gallstones. Unchanged tiny cyst in the right lobe.    PANCREAS: Normal.    SPLEEN: Normal.    ADRENAL GLANDS: Normal.    KIDNEYS/BLADDER: No significant mass, stones, or hydronephrosis. There are simple or benign cysts. No follow up is needed. Bladder unremarkable.    BOWEL: Diverticulosis of the colon. No acute inflammatory change. No obstruction. Normal appendix.    LYMPH NODES: No enlarged lymph nodes. 4.4 cm simple fluid collection along the left external iliac region suggestive of lymphocele or seroma (12/196).    VASCULATURE: Mild  atherosclerotic calcification. No aneurysm.    PELVIC ORGANS: Normal.    MUSCULOSKELETAL: Posterior fusion hardware in the lumbosacral spine. Mild degenerative changes elsewhere in the spine. No aggressive or destructive lesion. Healed right rib fractures. Postsurgical changes in the anterior abdominal wall.      Impression    IMPRESSION:  1.  Extensive acute pulmonary emboli, including saddle embolus involving the main pulmonary artery. Secondary signs of right heart strain with elevated RV/LV ratio and reflux of contrast into the IVC.    2.  No evidence of pulmonary infarct.    [Critical Result: New diagnosis of pulmonary embolism]    Finding was identified on 12/24/2022 10:37 AM.     Dr. Amilcar Tubbs was contacted by me on 12/24/2022 10:42 AM and verbalized understanding of the critical result.    US Lower Extremity Venous Duplex Bilateral    Narrative    EXAM: US LOWER EXTREMITY VENOUS DUPLEX BILATERAL  LOCATION: [facility]  DATE/TIME: 12/24/2022 10:59 AM    INDICATION: BL lower leg swelling, L>R, post op several weeks from L spine fusion  COMPARISON: None.  TECHNIQUE: Venous Duplex ultrasound of bilateral lower extremities with and without compression, augmentation and duplex. Color flow and spectral Doppler with waveform analysis performed.    FINDINGS: Exam includes the common femoral, femoral, popliteal veins as well as segmentally visualized deep calf veins and greater saphenous vein.     RIGHT: No deep vein thrombosis. No superficial thrombophlebitis. No popliteal cyst.    LEFT: Nonocclusive thrombus in the femoral vein in the upper and mid thigh and occlusive thrombus in the femoral vein in the distal thigh. Nonocclusive thrombus in the popliteal vein and tibioperoneal trunk. The posterior tibial and peroneal veins of the   calf are patent and negative for thrombus. No superficial thrombophlebitis. No popliteal cyst.      Impression    IMPRESSION:  1.  Positive for large burden of  left lower extremity DVT in the femoral and popliteal veins and tibioperoneal trunk.   CT Head w/o Contrast    Narrative    EXAM: CT HEAD W/O CONTRAST  LOCATION: [facility]  DATE/TIME: 12/24/2022 11:42 AM    INDICATION: Syncope, head injury.  COMPARISON: None.  TECHNIQUE: Routine CT Head without IV contrast. Multiplanar reformats. Dose reduction techniques were used.    FINDINGS:  INTRACRANIAL CONTENTS: There is intravenous contrast on board from a recent chest CT. No intracranial hemorrhage, extraaxial collection, or mass effect.  No CT evidence of acute infarct. Mild presumed chronic small vessel ischemic changes. Mild   generalized volume loss. No hydrocephalus.     VISUALIZED ORBITS/SINUSES/MASTOIDS: No intraorbital abnormality. No paranasal sinus mucosal disease. No middle ear or mastoid effusion.    BONES/SOFT TISSUES: No acute abnormality.      Impression    IMPRESSION:  1.  No CT evidence for acute intracranial process.  2.  Brain atrophy and presumed chronic microvascular ischemic changes as above.   IR Pulmonary Angiogram Bilateral    Narrative    IR PULMONARY ANGIOGRAM BILATERAL   12/24/2022 3:40 PM     CLINICAL HISTORY/INDICATION: submassive pe, syncope, high risk    PROCEDURES PERFORMED:   1. Bilateral pulmonary angiography.  2. Bilateral pulmonary artery thrombectomy.  3. IVC filter placement    MEDICATIONS: 12 mL 1% lidocaine, 3 mg Versed IV, 150 mcg Fentanyl IV  4000 units heparin    CONTRAST: 140    FLUORO: 22.4 minutes    AIR KERMA: 565.0 to mGy    CONSENT: Following a discussion of the risks, benefits, indications  and alternatives to treatment, appropriate informed consent was  obtained.      TIMEOUT: A timeout was performed per universal protocol policy to  ensure correct patient, site, and procedure to be performed.     PROCEDURE AND FINDINGS:  Following a discussion of the risks, benefits, indications, and  alternatives to treatment, appropriate informed consent was  obtained  from the patient. The patient was brought to the interventional  radiology suite and placed supine on the table. Bilateral groins were  prepped and draped in a sterile fashion.  A timeout was performed per  universal protocol policy to confirm the correct patient, site and  procedure to be performed.    A preliminary ultrasound of the right  groin was performed and  demonstrates a patent right common femoral vein. A permanent  ultrasound image was recorded.  Using a combination of fluoroscopy and  ultrasound, an access site was determined.  The overlying skin was  anesthetized with 1% Lidocaine.  Using ultrasound guidance,  access  into the right common femoral vein was obtained with visualization of  needle entry into the vessel. A 0.018 wire was advanced through the  needle and exchange was made for a 5 [place] micropuncture sheath. A  0.035 wire was advanced through the micropuncture sheath and exchange  was made for a 6 [place] sheath and subsequently a 24 [place] sheath.  A  total of 4000 Units Heparin IV was administered to maintain an ACT of  250-300.     A pigtail was passed across the right atrium into the right ventricle  and subsequently into the main pulmonary artery. The pigtail was  replaced with a 5 [place] angiographic catheter and Bentson wire which  were maneuvered into a distal right lower lobe pulmonary arterial  branch. A stiff wire was placed. The 5 [place] angiographic catheter  was removed. A 24 [place] FloTriever device was then passed into the  right pulmonary artery centrally. Digital subtraction angiography was  performed, images showed large amount of thrombus. The FloTriever was  then advanced into the right  main pulmonary artery to the level of  the embolus and mechanical thrombectomy was performed with removal of  large amount of pulmonary embolus. Post aspiration angiography was  performed, images showed improvement in flow in the right pulmonary  arterial tree.    The  FloTriever was brought back into the main pulmonary artery. A 5  [place] angiographic catheter and Bentson wire which were maneuvered  into a distal left lower lobe pulmonary arterial branch. A stiff wire  was placed. The 5 [place] angiographic catheter was removed. A 24  [place] FloTriever device was then passed into the left pulmonary  artery centrally. Digital subtraction angiography was performed,  images showed large amount of thrombus. The FloTriever was then  advanced into the left main pulmonary artery to the level of the  embolus and mechanical thrombectomy was performed with removal of  origin of of pulmonary embolus. Post aspiration angiography was  performed, images showed improvement in flow in the left pulmonary  arterial tree.    Because the patient has known left lower extremity DVT extending into  the proximal superficial femoral vein, it was elected to place an IVC  filter to minimize risk of additional future pulmonary emboli. An 8.5  [place] sheath was maneuvered through the existing in a 24 [place]  sheath into the IVC. An IVC venogram was performed. The renal vein  inflow was identified. Subsequently, a do not lead retrievable filter  was deployed with apex at the renal vein inflow. A fluoroscopic image  was saved to document position of the filter. All catheters and wires  were removed. The puncture site was closed with 2 probable glide  suture mediated closure devices. This was supplemented with a  pursestring suture.     Throughout the procedure, the patient was monitored by a radiology  nurse for cardiac rhythm and oxygen saturation which remained stable.  Total moderate sedation time was 22.4 min. The patient tolerated the  procedure well and left interventional radiology in stable condition.      Impression    IMPRESSION: Bilateral pulmonary thrombectomy and IVC filter placed as  above.    MARANDA SAINZ MD         SYSTEM ID:  [id number]     Medications     heparin Stopped (12/25/22 0000)      - MEDICATION INSTRUCTIONS -         atorvastatin  40 mg Oral Daily     pantoprazole  40 mg Intravenous Daily with breakfast     sodium chloride (PF)  3 mL Intracatheter Q8H

## 2022-12-25 NOTE — CONSULTS
Urology consult for:  Hematuria after straight cath   Req provider:  Homa      HPI:  Patient about a month out from back surgery had catheter in place post op, was removed and voiding fine came in now with PE.  Had in/out cath 1x and hematuria after that (yesterday). Heparin gtt stopped overnight and resumed this AM.  Voiding OK since then and urine clearing and PVR scan low.  denies any prostate meds.  Saw Dr Parham 2 months ago for simple renal cysts.        Past Medical History:   Diagnosis Date     Acute deep vein thrombosis (DVT) of left lower extremity (H)      Acute saddle pulmonary embolism with acute cor pulmonale (H) 12/24/2022     Hyperlipidemia LDL goal <160        Review Of Systems:  General: Denies F/C  Respiratory: see HPI  Cardiovascular: Denies CP  Gastrointestinal: Denies N/V  Genitourinary: See HPI  Musculoskeletal: Denies LE pain  Neurologic: Denies HA  Psychiatric: denies confusion        Past Surgical History:   Procedure Laterality Date     COLONOSCOPY  2019     FUSION LUMBAR ANTERIOR ONE LEVEL N/A 11/22/2022    Procedure: LUMBAR 5 TO SACRAL 1 ANTERIOR LUMBAR INTERBODY FUSION;  Surgeon: Luis De La Fuente MD;  Location: [facility]     FUSION, SPINE, LUMBAR, 2 LEVELS, POSTERIOR APPROACH, ROBOTIC-ASSISTED N/A 11/22/2022    Procedure: WITH POSTERIOR INSTRUMENTED SPINAL FUSION, POSSIBLE REMOVAL OF HARDWARE of previous posterior lumbar instrumentation lumbar 3 to lumbar 5;  Surgeon: Luis De La Fuente MD;  Location: [facility] OR     HERNIA REPAIR  2012     IR PULMONARY ANGIOGRAM BILATERAL  12/24/2022     ORTHOPEDIC SURGERY  2018    lumbar  back       Social Hx:  Social History     Socioeconomic History     Marital status:      Spouse name: Not on file     Number of children: Not on file     Years of education: Not on file     Highest education level: Not on file   Occupational History     Not on file   Tobacco Use     Smoking status: Never     Smokeless tobacco: Never   Vaping Use     Vaping Use: Never used    Substance and Sexual Activity     Alcohol use: Not Currently     Drug use: Not Currently     Sexual activity: Not Currently     Partners: Female   Other Topics Concern     Parent/sibling w/ CABG, MI or angioplasty before 65F 55M? No   Social History Narrative     Not on file     Social Determinants of Health     Financial Resource Strain: Not on file   Food Insecurity: Not on file   Transportation Needs: Not on file   Physical Activity: Not on file   Stress: Not on file   Social Connections: Not on file   Intimate Partner Violence: Not on file   Housing Stability: Not on file       Meds:  No current outpatient medications on file.          No Known Allergies      Labs:    ROUTINE IP LABS (Last four results)  BMP  Recent Labs   Lab 12/25/22  1407 12/25/22  0524 12/25/22  0432 12/25/22  0011 12/24/22  1659 12/24/22  0940   NA  --  140  --   --   --  139   POTASSIUM  --  3.7  --   --   --  3.8   CHLORIDE  --  110*  --   --   --  107   FAINA  --  8.1*  --   --   --  8.4*   CO2  --  23  --   --   --  24   BUN  --  10  --   --   --  9   CR  --  0.75  --   --   --  1.10   GLC 97 92 87 89   < > 217*    < > = values in this interval not displayed.     CBC  Recent Labs   Lab 12/25/22  1137 12/25/22  0524 12/24/22  1721 12/24/22  0940   WBC 5.5 5.6 7.2 8.0   RBC 3.90* 4.00* 4.32* 4.55   HGB 11.7*  11.7* 11.7* 13.0* 13.5   HCT 34.5* 36.2* 39.5* 40.4   MCV 89 91 91 89   MCH 30.0 29.3 30.1 29.7   MCHC 33.9 32.3 32.9 33.4   RDW 13.2 13.2 13.1 13.2    175 180 171     INRNo lab results found in last 7 days.    UA RESULTS:  No results for input(s): COLOR, APPEARANCE, URINEGLC, URINEBILI, URINEKETONE, SG, UBLD, URINEPH, PROTEIN, UROBILINOGEN, NITRITE, LEUKEST, RBCU, WBCU in the last 98585 hours.    UC:      Imaging:    EXAM: CT CHEST PE ABDOMEN PELVIS W CONTRAST  LOCATION: [facility]  DATE/TIME: 12/24/2022 10:08 AM     INDICATION: Syncope, shortness of breath, hypoxia, concern for PE  sepsis  COMPARISON: CT abdomen 10/18/2022  TECHNIQUE: CT chest pulmonary angiogram and routine CT abdomen pelvis with IV contrast. Arterial phase through the chest and venous phase through the abdomen and pelvis. Multiplanar reformats and MIP reconstructions were performed. Dose reduction   techniques were used.   CONTRAST: 111mL Isovue 370     FINDINGS:  ANGIOGRAM CHEST: Extensive bilateral acute pulmonary emboli, including a large saddle embolus involving the main pulmonary artery. Thoracic aorta is negative for dissection. RV/LV ratio abnormal, greater than 1.0.      LUNGS AND PLEURA: Fainted tree-in-bud opacities in the lower lobes bilaterally, unchanged compared to 10/18/2022.     MEDIASTINUM/AXILLAE: No lymphadenopathy. No thoracic aortic aneurysms.     CORONARY ARTERY CALCIFICATION: Mild.     HEPATOBILIARY: No significant mass or bile duct dilatation. No calcified gallstones. Unchanged tiny cyst in the right lobe.     PANCREAS: Normal.     SPLEEN: Normal.     ADRENAL GLANDS: Normal.     KIDNEYS/BLADDER: No significant mass, stones, or hydronephrosis. There are simple or benign cysts. No follow up is needed. Bladder unremarkable.     BOWEL: Diverticulosis of the colon. No acute inflammatory change. No obstruction. Normal appendix.     LYMPH NODES: No enlarged lymph nodes. 4.4 cm simple fluid collection along the left external iliac region suggestive of lymphocele or seroma (12/196).     VASCULATURE: Mild atherosclerotic calcification. No aneurysm.     PELVIC ORGANS: Normal.     MUSCULOSKELETAL: Posterior fusion hardware in the lumbosacral spine. Mild degenerative changes elsewhere in the spine. No aggressive or destructive lesion. Healed right rib fractures. Postsurgical changes in the anterior abdominal wall.                                                                      IMPRESSION:  1.  Extensive acute pulmonary emboli, including saddle embolus involving the main pulmonary artery. Secondary signs of  "right heart strain with elevated RV/LV ratio and reflux of contrast into the IVC.     2.  No evidence of pulmonary infarct.     [Critical Result: New diagnosis of pulmonary embolism]     Finding was identified on 12/24/2022 10:37 AM.      Dr. Amilcar Tubbs was contacted by me on 12/24/2022 10:42 AM and verbalized understanding of the critical result.      Component           Exam:    /74   Pulse 82   Temp 97.9  F (36.6  C) (Oral)   Resp 17   Ht 1.803 m (5' 11\")   Wt 101 kg (222 lb 10.6 oz)   SpO2 95%   BMI 31.06 kg/m        Intake/Output Summary (Last 24 hours) at 12/25/2022 1658  Last data filed at 12/25/2022 1400  Gross per 24 hour   Intake 741.2 ml   Output 300 ml   Net 441.2 ml       EXAM:  Constitutional: healthy, alert, no distress and cooperative   Cardiovascular: RRR  Respiratory: CTAB anteriorly, no secondary muscle use  Psychiatric: mentation appears normal and affect normal/bright  Neck: no asymmetry  Abdomen: +BS, abdomen soft, non-tender. No masses, no CVAT  : No urinary catheter in place  MSK: no calf tenderness, no edema.            Assessment/plan:       1.  Hematuria after in/out catheter/heparin.  Voiding ok and urine clearing.  Follow PVR scans and try to avoid catheter if possible.          John Luna MD  [facility]  Pager:  [phone]  After 5pm and on weekends, please call [phone]      "

## 2022-12-25 NOTE — PROGRESS NOTES
MD Aldridge has been notified of hematuria( pomegranate color), 300 ml. VS WNL. Patient denies dyspnea . Patient denies fever/chills. Denies any chest pain/palpitations. Denies any lightheadedness or syncope.The order to pause heparin drip and monitor UOP color, Hemoglobin lab in AM, urology consult placed.

## 2022-12-25 NOTE — PROVIDER NOTIFICATION
Brief update:    Paged regarding gross hematuria, 300 mL    On heparin for submassive pulmonary embolism.  Did have supratherapeutic Xa previously, heparin now restarted.    Vitally stable    Hemoglobin pending for a.m.    Pause heparin.  Page with next void to assess degree of hematuria.  If stable or improving, anticipate resumption of heparin    [facility] consulted.  It appears patient may have been followed by [facility] in the past.    Timothy Aldridge MD  12:07 AM

## 2022-12-25 NOTE — PROGRESS NOTES
"X cover 2005    Called by nursing \"FYI troponin 1850\", results not available in [facility] yet, last trops 50---1245  - patient admitted with saddle PE and extensive LLE DVT, s/p thrombectomy by IR  - patient on Heparin drip  - monitor clinically  "

## 2022-12-26 LAB
GLUCOSE BLDC GLUCOMTR-MCNC: 86 MG/DL (ref 70–99)
GLUCOSE BLDC GLUCOMTR-MCNC: 98 MG/DL (ref 70–99)
HGB BLD-MCNC: 11.5 G/DL (ref 13.3–17.7)
HGB BLD-MCNC: 11.9 G/DL (ref 13.3–17.7)
HGB BLD-MCNC: 13.4 G/DL (ref 13.3–17.7)
PLATELET # BLD AUTO: 178 10E3/UL (ref 150–450)
UFH PPP CHRO-ACNC: 0.26 IU/ML
UFH PPP CHRO-ACNC: 0.26 IU/ML
UFH PPP CHRO-ACNC: 0.33 IU/ML

## 2022-12-26 PROCEDURE — 250N000011 HC RX IP 250 OP 636: Performed by: INTERNAL MEDICINE

## 2022-12-26 PROCEDURE — 85520 HEPARIN ASSAY: CPT | Performed by: INTERNAL MEDICINE

## 2022-12-26 PROCEDURE — 85049 AUTOMATED PLATELET COUNT: CPT | Performed by: HOSPITALIST

## 2022-12-26 PROCEDURE — 99233 SBSQ HOSP IP/OBS HIGH 50: CPT | Performed by: INTERNAL MEDICINE

## 2022-12-26 PROCEDURE — 36415 COLL VENOUS BLD VENIPUNCTURE: CPT | Performed by: HOSPITALIST

## 2022-12-26 PROCEDURE — 250N000013 HC RX MED GY IP 250 OP 250 PS 637: Performed by: HOSPITALIST

## 2022-12-26 PROCEDURE — 36415 COLL VENOUS BLD VENIPUNCTURE: CPT | Performed by: INTERNAL MEDICINE

## 2022-12-26 PROCEDURE — C9113 INJ PANTOPRAZOLE SODIUM, VIA: HCPCS | Performed by: HOSPITALIST

## 2022-12-26 PROCEDURE — 85520 HEPARIN ASSAY: CPT | Performed by: HOSPITALIST

## 2022-12-26 PROCEDURE — 120N000001 HC R&B MED SURG/OB

## 2022-12-26 PROCEDURE — 85018 HEMOGLOBIN: CPT | Performed by: INTERNAL MEDICINE

## 2022-12-26 PROCEDURE — 250N000011 HC RX IP 250 OP 636: Performed by: HOSPITALIST

## 2022-12-26 RX ORDER — PANTOPRAZOLE SODIUM 40 MG/1
40 TABLET, DELAYED RELEASE ORAL
Status: DISCONTINUED | OUTPATIENT
Start: 2022-12-27 | End: 2022-12-27 | Stop reason: HOSPADM

## 2022-12-26 RX ADMIN — PANTOPRAZOLE SODIUM 40 MG: 40 INJECTION, POWDER, FOR SOLUTION INTRAVENOUS at 08:45

## 2022-12-26 RX ADMIN — HEPARIN SODIUM 1500 UNITS/HR: 10000 INJECTION, SOLUTION INTRAVENOUS at 20:49

## 2022-12-26 RX ADMIN — HEPARIN SODIUM 1350 UNITS/HR: 10000 INJECTION, SOLUTION INTRAVENOUS at 03:15

## 2022-12-26 RX ADMIN — ATORVASTATIN CALCIUM 40 MG: 40 TABLET, FILM COATED ORAL at 08:45

## 2022-12-26 ASSESSMENT — ACTIVITIES OF DAILY LIVING (ADL)
ADLS_ACUITY_SCORE: 24
ADLS_ACUITY_SCORE: 28
ADLS_ACUITY_SCORE: 24
ADLS_ACUITY_SCORE: 28

## 2022-12-26 NOTE — PLAN OF CARE
Patient observed sleeping throughout the shift, no s/s of distress or discomfort observed or reported.Appropriate interventions for patient's safety remain in place. Urine is yellow, small bloody meatal discharge noted.  R groin dressing demonstrates no evidence of drainage/hematoma/inflammation. There is no motor deficit or weakness involving the lower extremities. Sensation is intact. Circulation is grossly intact. Will continue to monitor.

## 2022-12-26 NOTE — PROGRESS NOTES
[facility]    Medicine Progress Note - Hospitalist Service    Date of Admission:  12/24/2022    Assessment & Plan   Bryon Meyer is a 77 year old male admitted on 12/24/2022.  Past history of HLD and recent lumbar spinal fusion 11/22/22 who presents with dyspnea, LLE swelling and syncope, found to have LLE DVT and saddle PE.        Syncope and acute hypoxic respiratory failure due to saddle PE  Extensive LLE DVT  * Syncopal episode 12/24 while putting on shoes.  Reporting LLE swelling for 1-1.5 weeks and progressive dyspnea x1 week.    * CT pulm angio showing extensive acute bilateral PE including saddle PE with signs of RV strain.  EKG with slight ST depression of inferolateral leads, though BNP and trop wnl  * BLE US showing DVT from left femoral vein to tibioperoneal trunk.    * hemodynamically stable though requiring 15L oxygen at arrival  * Likely provoked due to recent spine surgery (reporting minimal activity for about 2 weeks after surgery on 11/22/22).  No personal or family history of DVT.  -Status post mechanical thrombectomy and IVC placement for extensive left lower extremity DVT on 12/24  -Patient was started on heparin drip, however due to hematuria(see below) heparin drip initially held and then restarted at low intensity  -Patient has tolerated low intensity heparin over the last 24 hours and hemoglobin has remained stable  -Will change heparin to high intensity  -Pharmacy liaison was consulted for pricing for DOAC, discussed price of DOAC and patient wants to start on DOAC rather than Coumadin  -Will monitor for next 24 hours with high intensity Heparin to see if he tolerates and likely can change to DOAC tomorrow and patient can likely be discharged if he tolerates  -Transfer out of ICU     Non-ST relation MI, likely type II secondary to right heart strain  -Patient currently denies any chest pain.  Admission EKG with no acute ST or T wave changes  -Repeat troponin with  "improving trend and echocardiogram with no wall motion abnormality and normal EF  -Awaiting echo results to see if there is any wall motion abnormality    Gross hematuria  Urinary retention  Acute blood loss anemia likely from hematuria  Patient had urinary retention after IR procedure for which patient had straight cath.  Patient started having gross hematuria post straight cath  -Hemoglobin dropped from 13 to [place] of 11.7   -Patient has been able to void on his own, urine is clearing and hemoglobin has remained stable since  -Appreciate urology input.  -We will stop every 6 hours hemoglobin check     Recent L5-S1 fusion 11/22/22  - PT/OT once ambulatory     HLD  - continue PTA statin        Diet: Regular Diet Adult    DVT Prophylaxis: Heparin drip as above  Sauer Catheter: Not present  Central Lines: None  Cardiac Monitoring: ACTIVE order. Indication: ICU  Code Status: Full Code      Disposition Plan     Expected Discharge Date: 12/26/2022                The patient's care was discussed with the Bedside Nurse, Patient and Urology Consultant.  Also called the wife to update and questions answered on 12/25    Rachael Luther MD  Hospitalist Service  [facility]  Securely message with the Vocera Web Console (learn more here)  Text page via [employer] Paging/Directory         Clinically Significant Risk Factors              # Hypoalbuminemia: Lowest albumin = 3.1 g/dL at 12/24/2022  9:40 AM, will monitor as appropriate            # Obesity: Estimated body mass index is 30.56 kg/m  as calculated from the following:    Height as of this encounter: 1.803 m (5' 11\").    Weight as of this encounter: 99.4 kg (219 lb 2.2 oz)., PRESENT ON ADMISSION         ______________________________________________________________________    Interval History   Patient continues to report that hematuria is improving and urine is now clear.  No more bleeding on the groin site.  No new nursing concerns.    Data reviewed " today: I reviewed all medications, new labs and imaging results over the last 24 hours.    Physical Exam   Vital Signs: Temp: 97.7  F (36.5  C) Temp src: Oral BP: 138/84 Pulse: 75   Resp: 12 SpO2: 94 %      Weight: 219 lbs 2.2 oz  Exam:  Constitutional: Awake, alert and no distress. Appears comfortable  Head: Normocephalic. No masses, lesions, tenderness or abnormalities  ENT: ENT exam normal, no neck nodes or sinus tenderness  Cardiovascular: RRR.  No murmurs, no rubs or JVD  Respiratory: Normal WOB,b/l equal air entry, no wheezes or crackles   Gastrointestinal: Abdomen soft, non-tender. BS normal. No masses, organomegaly  : Deferred  Extremities : No edema , no bruit on right groin, no evidence of bruise/hematoma.  Dressing CDI on right groin.  No clubbing or cyanosis    Neurologic: Cranial nerves II-XII grossly intact , power symmetrical, Reflexes normal and symmetric. Sensation grossly WNL.      Data   Recent Labs   Lab 12/26/22  0611 12/26/22  0446 12/26/22  0148 12/26/22  0008 12/25/22  2039 12/25/22  1939 12/25/22  1407 12/25/22  1137 12/25/22  0524 12/24/22  1935 12/24/22  1721 12/24/22  1659 12/24/22  0940   WBC  --   --   --   --   --   --   --  5.5 5.6  --  7.2  --  8.0   HGB 11.9*  --   --  11.5* 12.6*  --   --  11.7*  11.7* 11.7*  --  13.0*  --  13.5   MCV  --   --   --   --   --   --   --  89 91  --  91  --  89   PLT  --   --   --   --   --   --   --  168 175  --  180  --  171   NA  --   --   --   --   --   --   --   --  140  --   --   --  139   POTASSIUM  --   --   --   --   --   --   --   --  3.7  --   --   --  3.8   CHLORIDE  --   --   --   --   --   --   --   --  110*  --   --   --  107   CO2  --   --   --   --   --   --   --   --  23  --   --   --  24   BUN  --   --   --   --   --   --   --   --  10  --   --   --  9   CR  --   --   --   --   --   --   --   --  0.75  --   --   --  1.10   ANIONGAP  --   --   --   --   --   --   --   --  7  --   --   --  8   FAINA  --   --   --   --   --   --   --    --  8.1*  --   --   --  8.4*   GLC  --  98 86  --   --  89   < >  --  92   < >  --    < > 217*   ALBUMIN  --   --   --   --   --   --   --   --   --   --   --   --  3.1*   PROTTOTAL  --   --   --   --   --   --   --   --   --   --   --   --  6.0*   BILITOTAL  --   --   --   --   --   --   --   --   --   --   --   --  1.1   ALKPHOS  --   --   --   --   --   --   --   --   --   --   --   --  126   ALT  --   --   --   --   --   --   --   --   --   --   --   --  29   AST  --   --   --   --   --   --   --   --   --   --   --   --  30    < > = values in this interval not displayed.     Recent Results (from the past 24 hour(s))   Echocardiogram Complete   Result Value    LVEF  60-65%    Narrative    [id number]  [id number]  [id number]  [id number]^SUE^SOHA     [facility]  Echocardiography Laboratory  [address] [place] [address]     Name: ANDREY VILLALBA  MRN: [id number]  : 1945  Study Date: 2022 09:30 AM  Age: 77 yrs  Gender: Male  Patient Location: [facility]  Reason For Study: Pulmonary Embolism  Ordering Physician: SOHA ROGERS  Referring Physician: None  Performed By: Concepción Zimmerman RDCS     BSA: 2.2 m2  Height: 71 in  Weight: 222 lb  HR: 76  BP: 117/62 mmHg  ______________________________________________________________________________  Procedure  Complete Portable Echo Adult.  ______________________________________________________________________________  Interpretation Summary     1. The left ventricle is normal in size. There is mild concentric left  ventricular hypertrophy. Left ventricular systolic function is normal. The  visual ejection fraction is 60-65%. Diastolic Doppler findings (E/E' ratio  and/or other parameters) suggest left ventricular filling pressures are  normal. No regional wall motion abnormalities noted.  2. The right ventricle is normal size. The right ventricular systolic function  is mild to moderately reduced.  3. Trace to mild mitral and tricuspid  regurgitation.  4. Mild dilatation of the aortic root and ascending thoracic aorta.  5. No pericardial effusion.  6. No previous study for comparison.  ______________________________________________________________________________  Left Ventricle  The left ventricle is normal in size. There is mild concentric left  ventricular hypertrophy. Left ventricular systolic function is normal. The  visual ejection fraction is 60-65%. Diastolic Doppler findings (E/E' ratio  and/or other parameters) suggest left ventricular filling pressures are  normal. No regional wall motion abnormalities noted.     Right Ventricle  The right ventricle is normal size. The right ventricular systolic function is  mild to moderately reduced.     Atria  Normal left atrial size. Right atrial size is normal.     Mitral Valve  There is trace mitral regurgitation.     Tricuspid Valve  There is mild (1+) tricuspid regurgitation. The right ventricular systolic  pressure is approximated at 31.6 mmHg plus the right atrial pressure. IVC  diameter <2.1 cm collapsing >50% with sniff suggests a normal RA pressure of 3  mmHg.     Aortic Valve  The aortic valve is trileaflet. There is mild (1+) aortic regurgitation. No  aortic stenosis is present.     Pulmonic Valve  There is no pulmonic valvular stenosis.     Vessels  Mild aortic root dilatation. The ascending aorta is Mildly dilated. The  inferior vena cava is normal.     Pericardium  There is no pericardial effusion.     Rhythm  Sinus rhythm was noted.  ______________________________________________________________________________  MMode/2D Measurements & Calculations  IVSd: 1.3 cm     LVIDd: 4.3 cm  LVIDs: 2.3 cm  LVPWd: 1.3 cm  FS: 46.1 %  LV mass(C)d: 202.4 grams  LV mass(C)dI: 91.8 grams/m2  Ao root diam: 3.8 cm  LA dimension: 3.9 cm  asc Aorta Diam: 4.0 cm  LA/Ao: 1.0  LA Volume (BP): 55.0 ml  LA Volume Index (BP): 25.0 ml/m2  RWT: 0.59     Doppler Measurements & Calculations  MV E max lars: 73.9  cm/sec  MV A max lars: 76.5 cm/sec  MV E/A: 0.97  MV dec time: 0.36 sec  PA acc time: 0.07 sec  TR max lars: 281.0 cm/sec  TR max P.6 mmHg  E/E' av.7  Lateral E/e': 6.3  Medial E/e': 7.1     ______________________________________________________________________________  Report approved by: Latoya Munroe 2022 11:39 AM           Medications     heparin Stopped (22 0000)     - MEDICATION INSTRUCTIONS -         atorvastatin  40 mg Oral Daily     pantoprazole  40 mg Intravenous Daily with breakfast     sodium chloride (PF)  3 mL Intracatheter Q8H

## 2022-12-26 NOTE — CONSULTS
Discharge Pharmacy Test Claim    Eliquis and xarelto are covered by patient's MedicareBlue Part D plan. Patient has an unmet 2022 deductible which resets at the first of the new year. Once the deductible has been met, monthly copay is $46/mo. [place] pharmacy has 30-day free trial vouchers available for either DOAC.    Test Claim Initial Copay After Deductible is Met   eliquis 551.00 46.00   xarelto 551.00 46.00       Qi Morales 12/26 - 1/2  [facility] Pharm[facility] Liaison  Ph: [phone] Pager: [phone]   Securely message with the Vocera Web Console (learn more here)

## 2022-12-26 NOTE — PLAN OF CARE
Goal Outcome Evaluation:      Plan of Care Reviewed With: patient, spouse, child           Pt improving today. Urine bright red this morning, now shawn. Pt voiding well. Urology in to see pt. Heparin gtt restarted on low intensity. No new bleeding noted. Hgb stable. Groin site without any more bleeding. Family in to visit pt today. Will continue to monitor.

## 2022-12-26 NOTE — PLAN OF CARE
Goal Outcome Evaluation:  AXOX4, up with SBA, denies any pain, SR,  BP wnl, on RA, tolerating PO, voiding in urinal, clear urine, heparin  drip changed to high intensity per md now. VSS. SBAR given to floor RN, pt transferred via wheelchair with all belongings.

## 2022-12-27 ENCOUNTER — APPOINTMENT (OUTPATIENT)
Dept: PHYSICAL THERAPY | Facility: CLINIC | Age: 77
DRG: 163 | End: 2022-12-27
Attending: INTERNAL MEDICINE
Payer: MEDICARE

## 2022-12-27 VITALS
HEIGHT: 71 IN | TEMPERATURE: 98.3 F | HEART RATE: 66 BPM | OXYGEN SATURATION: 95 % | DIASTOLIC BLOOD PRESSURE: 67 MMHG | BODY MASS INDEX: 30.68 KG/M2 | RESPIRATION RATE: 16 BRPM | SYSTOLIC BLOOD PRESSURE: 123 MMHG | WEIGHT: 219.14 LBS

## 2022-12-27 DIAGNOSIS — I26.02 ACUTE SADDLE PULMONARY EMBOLISM WITH ACUTE COR PULMONALE (H): Primary | ICD-10-CM

## 2022-12-27 LAB
ERYTHROCYTE [DISTWIDTH] IN BLOOD BY AUTOMATED COUNT: 13 % (ref 10–15)
HCT VFR BLD AUTO: 33.8 % (ref 40–53)
HGB BLD-MCNC: 11.7 G/DL (ref 13.3–17.7)
MCH RBC QN AUTO: 30.4 PG (ref 26.5–33)
MCHC RBC AUTO-ENTMCNC: 34.6 G/DL (ref 31.5–36.5)
MCV RBC AUTO: 88 FL (ref 78–100)
PLATELET # BLD AUTO: 171 10E3/UL (ref 150–450)
RBC # BLD AUTO: 3.85 10E6/UL (ref 4.4–5.9)
UFH PPP CHRO-ACNC: 0.5 IU/ML
UFH PPP CHRO-ACNC: 0.56 IU/ML
WBC # BLD AUTO: 4.8 10E3/UL (ref 4–11)

## 2022-12-27 PROCEDURE — 85520 HEPARIN ASSAY: CPT | Performed by: HOSPITALIST

## 2022-12-27 PROCEDURE — 97530 THERAPEUTIC ACTIVITIES: CPT | Mod: GP

## 2022-12-27 PROCEDURE — 36415 COLL VENOUS BLD VENIPUNCTURE: CPT | Performed by: HOSPITALIST

## 2022-12-27 PROCEDURE — 250N000013 HC RX MED GY IP 250 OP 250 PS 637: Performed by: INTERNAL MEDICINE

## 2022-12-27 PROCEDURE — 85027 COMPLETE CBC AUTOMATED: CPT | Performed by: INTERNAL MEDICINE

## 2022-12-27 PROCEDURE — 97161 PT EVAL LOW COMPLEX 20 MIN: CPT | Mod: GP

## 2022-12-27 PROCEDURE — 99239 HOSP IP/OBS DSCHRG MGMT >30: CPT | Performed by: INTERNAL MEDICINE

## 2022-12-27 PROCEDURE — 97116 GAIT TRAINING THERAPY: CPT | Mod: GP

## 2022-12-27 RX ADMIN — ATORVASTATIN CALCIUM 40 MG: 40 TABLET, FILM COATED ORAL at 08:15

## 2022-12-27 RX ADMIN — PANTOPRAZOLE SODIUM 40 MG: 40 TABLET, DELAYED RELEASE ORAL at 06:32

## 2022-12-27 RX ADMIN — APIXABAN 10 MG: 5 TABLET, FILM COATED ORAL at 10:43

## 2022-12-27 ASSESSMENT — ACTIVITIES OF DAILY LIVING (ADL)
ADLS_ACUITY_SCORE: 24

## 2022-12-27 NOTE — PLAN OF CARE
Pt transferred from ICU at 1400. Hep drip infusing at 1350 units/hr, recheck at 1930 this evening. Up with SBA. Denies SOB. L leg is ACE wrapped, took off at 1845. Pedal pulses present with doppler. Dressing on R groin from IVC filter placement and on back from thrombectomy, both are CDI. No blood noted in urine.

## 2022-12-27 NOTE — PROGRESS NOTES
UROLOGY BRIEF NOTE    Chart reviewed. Urology following for hematuria after straight cath. Per flowsheet charting by RN, urine has been shawn for the past 24 hours. Has not required repeat straight cath since initial occurrence (PVRs 400 or less). No additional urologic recommendations this admission.    Urology will sign off for now.   Please call with any questions or concerns.   Thank you for the opportunity to participate in this patient's care.      Ysabel Brooke PA-C  [facility]   [email or web address]   Text Page (7:30am to 4:30pm)

## 2022-12-27 NOTE — PROGRESS NOTES
"   12/27/22 0925   Appointment Info   Signing Clinician's Name / Credentials (PT) Valerio Hebert DPT       Present no   Living Environment   People in Home spouse   Current Living Arrangements house   Home Accessibility stairs to enter home   Number of Stairs, Main Entrance 1   Stair Railings, Main Entrance none   Transportation Anticipated car, drives self;family or friend will provide   Living Environment Comments Pt lives in a home with his spouse with 1 stair to enter. Pt and spouse both drive. 2 levels in home but has an elevator within home.   Self-Care   Usual Activity Tolerance good   Current Activity Tolerance moderate   Regular Exercise Yes   Activity/Exercise Type walking   Fall history within last six months yes   Number of times patient has fallen within last six months 2   Activity/Exercise/Self-Care Comment Pt is IND at baseline without use of AD for mobility. Previously walking outside daily with spouse around [facility] [employer] a couple months ago. Had a spine surgery ~1 month ago and used a FWW for ~2-3 weeks. Owns a FWW. Was not using AD prior to this hospitalization.   General Information   Onset of Illness/Injury or Date of Surgery 12/24/22   Referring Physician Rachael Luther MD   Patient/Family Therapy Goals Statement (PT) return home and get back to walking   Pertinent History of Current Problem (include personal factors and/or comorbidities that impact the POC) Per chart review, \"Bryon Meyer is a 77 year old male admitted on 12/24/2022.  Past history of HLD and recent lumbar spinal fusion 11/22/22 who presents with dyspnea, LLE swelling and syncope, found to have LLE DVT and saddle PE.\"   Existing Precautions/Restrictions spinal   Cognition   Affect/Mental Status (Cognition) WNL   Orientation Status (Cognition) oriented x 4   Integumentary/Edema   Integumentary/Edema no deficits were identifed   Posture    Posture Not impaired   Range of Motion (ROM)   Range of Motion ROM is WFL "   Strength (Manual Muscle Testing)   Strength (Manual Muscle Testing) strength is WFL   Bed Mobility   Comment, (Bed Mobility) IND   Transfers   Comment, (Transfers) sit>stand w/ SBA   Gait/Stairs (Locomotion)   Distance in Feet 10' eval   Distance in Feet (Gait) 400'   Comment, (Gait/Stairs) pt ambulated 10' w/o AD for evaluation with SBA. Demonstrating steady pacing and equal stride length bilaterally. At times, demonstrating mild instability while turning, but no LOB.   Balance   Balance Comments mild dynamic balance impairment   Sensory Examination   Sensory Perception patient reports no sensory changes   Sensory Perception Comments noting good resolution of LE N/T symptoms following lumbar surgery   Clinical Impression   Criteria for Skilled Therapeutic Intervention Yes, treatment indicated   PT Diagnosis (PT) impaired functional mobility   Influenced by the following impairments mild dynamic balance impairment, impaired activity tolerance   Functional limitations due to impairments limited independence with functional mobility   Clinical Presentation (PT Evaluation Complexity) Stable/Uncomplicated   Clinical Presentation Rationale clinical judgement   Clinical Decision Making (Complexity) low complexity   Planned Therapy Interventions (PT) balance training;bed mobility training;gait training;patient/family education;stair training;strengthening;transfer training;progressive activity/exercise;home program guidelines   Risk & Benefits of therapy have been explained evaluation/treatment results reviewed;care plan/treatment goals reviewed;risks/benefits reviewed;current/potential barriers reviewed;participants voiced agreement with care plan;participants included;patient   PT Total Evaluation Time   PT Eval, Low Complexity Minutes (89993) 5   Physical Therapy Goals   PT Frequency One time eval and treatment only   PT Predicted Duration/Target Date for Goal Attainment 12/27/22   PT Goals Bed  Mobility;Transfers;Gait;Stairs   PT: Bed Mobility Independent;Supine to/from sit;Goal Met;Completed   PT: Transfers Supervision/stand-by assist;Sit to/from stand;Bed to/from chair;Assistive device;Goal Met;Completed   PT: Gait Supervision/stand-by assist;Greater than 200 feet;Goal Met;Completed   PT: Stairs Supervision/stand-by assist;1 stair;Goal Met;Completed   Interventions   Interventions Quick Adds Gait Training;Therapeutic Activity   Therapeutic Activity   Therapeutic Activities: dynamic activities to improve functional performance Minutes (62365) 9   Symptoms Noted During/After Treatment None   Treatment Detail/Skilled Intervention Greeted pt upon arrival to room. Pt agreeable to working with PT. PT evaluation completed and role of IP PT explained to pt. VSS throughout on RA. Supine>sit IND. Sit>stand w/ SBA. Cueing for safe and efficient sit<>stand procedure including scooting to the front edge of the chair, to lean forward with nose over toes, and hand and foot placement. Pt donned spinal brace IND while standing. Following ambulation, pt stand>sit to recliner with SBA. Education provided regarding safe progression of mobility while at home. Pt noting they recently bought a treadmill and also requesting guidance on upper extremity exercises. Discussed pushup progression and walking progression once returned home. Pt left sitting up in recliner at end of session with all needs met, call light within reach, and MD entering room.   Gait Training   Gait Training Minutes (31060) 11   Symptoms Noted During/After Treatment (Gait Training) fatigue   Treatment Detail/Skilled Intervention Pt ambulated 400' w/ SBA and no AD. Demonstrating steady pacing throughout with equal stride lengths bilaterally. Noting mild instability while turning with mild increased lateral steppage and decreased gait speed. Cueing to go slow while turning and increase base of support while keeping feet wide apart. Pt ascended and descended 1  stair in stairwell with SBA and handrail support on L hand side. Cueing for step-to pattern.   PT Discharge Planning   PT Plan d/c from PT. goals met with no further needs identified   PT Discharge Recommendation (DC Rec) home with assist   PT Rationale for DC Rec Pt is near or just below baseline functional mobility level. Demonstrating safety and SBA with functional mobility including bed mobility, transfers, ambulation, and stair navigation without use of AD. Recommend intermittent assistance from spouse at home with higher level IADLs such as laundry and grocery shopping while pt continues to recover from spinal surgery and improves tolerance to activity. Recommend continued activity in the form of a progressive ambulation program at home using patient's treadmill.   PT Brief overview of current status SBA w/ ambulation no AD. IND bed mobility   Total Session Time   Timed Code Treatment Minutes 20   Total Session Time (sum of timed and untimed services) 25

## 2022-12-27 NOTE — DISCHARGE SUMMARY
[facility]  Hospitalist Discharge Summary      Date of Admission:  12/24/2022  Date of Discharge:  12/27/2022  Discharging Provider: Rachael Luther MD  Discharge Service: Hospitalist Service    Discharge Diagnoses   Saddle pulmonary embolism  Syncope secondary to above  Acute hypoxic respiratory failure secondary to saddle PE, resolved prior to discharge  Extensive left lower extremity DVT status post IVC filter  Urinary retention, acute postprocedure, resolved now  Gross hematuria  Acute blood loss anemia from hematuria, stable prior to discharge  Non-ST elevation MI, type II from right heart strain  Recent L5-S1 fusion  Hyperlipidemia    Follow-ups Needed After Discharge   Follow-up Appointments     Follow-up and recommended labs and tests       Follow up with primary care provider, Physician No Ref-Primary, within 7   days for hospital follow- up.  The following labs/tests are recommended:   CBC.    Follow-up with interventional radiology regarding your IVC filter             Unresulted Labs Ordered in the Past 30 Days of this Admission     Date and Time Order Name Status Description    12/24/2022  9:37 AM Blood Culture Peripheral Blood Preliminary     12/24/2022  9:37 AM Blood Culture Peripheral Blood Preliminary           Discharge Disposition   Discharged to home  Condition at discharge: Stable    Hospital Course   Bryon Meyer is a 77 year old male admitted on 12/24/2022.  Past history of HLD and recent lumbar spinal fusion 11/22/22 who presents with dyspnea, LLE swelling and syncope, found to have LLE DVT and saddle PE.     Saddle pulmonary embolism- status post mechanical thrombectomy  Syncope and acute hypoxic respiratory failure due to saddle PE  Extensive LLE DVT-status post IVC filter  * Syncopal episode 12/24 while putting on shoes.  Reporting LLE swelling for 1-1.5 weeks and progressive dyspnea x1 week.    * CT pulm angio showing extensive acute bilateral PE including saddle PE  with signs of RV strain.  EKG with slight ST depression of inferolateral leads, though BNP and trop wnl  * BLE US showing DVT from left femoral vein to tibioperoneal trunk.    * hemodynamically stable though requiring 15L oxygen at arrival  * Likely provoked due to recent spine surgery (reporting minimal activity for about 2 weeks after surgery on 11/22/22).  No personal or family history of DVT.  -Status post mechanical thrombectomy and IVC placement for extensive left lower extremity DVT on 12/24  -Patient was started on heparin drip, however due to hematuria(see below) heparin drip initially held, restarted after hematuria started improving with close monitoring and patient tolerated high intensity full dose heparin prior to discharge  -Pharmacy liaison was consulted for pricing for DOAC, discussed price of DOAC and patient wants to start on DOAC rather than Coumadin  -Patient given a dose of DOAC prior to discharge  -Patient to follow-up with interventional radiology regarding IVC filter as per their discretion     Non-ST relation MI, likely type II secondary to right heart strain  -Patient currently denies any chest pain.  Admission EKG with no acute ST or T wave changes  -Repeat troponin with improving trend and echocardiogram with no wall motion abnormality and normal EF    Gross hematuria  Urinary retention  Acute blood loss anemia likely from hematuria  Patient had urinary retention after IR procedure for which patient had straight cath.  Patient started having gross hematuria post straight cath  -Hemoglobin dropped from 13 to around 11.7 and has since remained stable, did not need any transfusion  -Urology also evaluated the patient  -No further complications occurred and patient was able to void clear urine prior to discharge     Recent L5-S1 fusion 11/22/22  - PT recommended home with assist  -Outpatient follow-up with [facility] as scheduled     HLD  - continue PTA statin       Consultations This Hospital Stay    PHARMACY IP CONSULT  PHARMACY IP CONSULT  PHARMACY IP CONSULT  PHARMACY IP CONSULT  UROLOGY IP CONSULT  UROLOGY IP CONSULT  UROLOGY IP CONSULT  PHARMACY IP CONSULT  PHARMACY IP CONSULT  PHARMACY LIAISON FOR MEDICATION COVERAGE CONSULT  PHYSICAL THERAPY ADULT IP CONSULT    Code Status   Full Code    Time Spent on this Encounter   IRachael MD, personally saw the patient today and spent greater than 30 minutes discharging this patient.       Rachael Luther MD  [place] [address] [facility]  [address] [place] [address], [address]  [facility] [address]  Phone: [phone]  ______________________________________________________________________    Physical Exam   Vital Signs: Temp: 98.3  F (36.8  C) Temp src: Oral BP: 123/67 Pulse: 66   Resp: 16 SpO2: 95 % O2 Device: None (Room air)    Weight: 219 lbs 2.2 oz  Exam:  Constitutional: Awake, alert and no distress. Appears comfortable  Head: Normocephalic. No masses, lesions, tenderness or abnormalities  ENT: ENT exam normal, no neck nodes or sinus tenderness  Cardiovascular: RRR.  No murmurs, no rubs or JVD  Respiratory: Normal WOB,b/l equal air entry, no wheezes or crackles   Gastrointestinal: Abdomen soft, non-tender. BS normal. No masses, organomegaly  : Deferred  Extremities : Positive for left lower extremity edema , no clubbing or cyanosis         Primary Care Physician   Physician No Ref-Primary    Discharge Orders      Reason for your hospital stay    Saddle pulmonary embolism     Follow-up and recommended labs and tests     Follow up with primary care provider, Physician No Ref-Primary, within 7 days for hospital follow- up.  The following labs/tests are recommended: CBC.    Follow-up with interventional radiology regarding your IVC filter     Activity    Your activity upon discharge: activity as tolerated.  Follow precautions for recent IVC filter placement as advised     Discharge Instructions    You are supposed to be taking Eliquis 10 mg (2 tablets )  twice a day for a week and then 5 mg twice a day until recommended by her primary care provider  In case of any uncontrolled bleeding, gross blood loss, black tarry stool, mental status changes seek medical attention immediately as you have been started on blood thinner which increases your chance of bleeding     Diet    Follow this diet upon discharge: Orders Placed This Encounter      Regular Diet Adult       Significant Results and Procedures   Results for orders placed or performed during the hospital encounter of 12/24/22   CT Chest (PE) Abdomen Pelvis w Contrast     Value    Radiologist flags New diagnosis of pulmonary embolism (AA)    Narrative    EXAM: CT CHEST PE ABDOMEN PELVIS W CONTRAST  LOCATION: [facility]  DATE/TIME: 12/24/2022 10:08 AM    INDICATION: Syncope, shortness of breath, hypoxia, concern for PE sepsis  COMPARISON: CT abdomen 10/18/2022  TECHNIQUE: CT chest pulmonary angiogram and routine CT abdomen pelvis with IV contrast. Arterial phase through the chest and venous phase through the abdomen and pelvis. Multiplanar reformats and MIP reconstructions were performed. Dose reduction   techniques were used.   CONTRAST: 111mL Isovue 370    FINDINGS:  ANGIOGRAM CHEST: Extensive bilateral acute pulmonary emboli, including a large saddle embolus involving the main pulmonary artery. Thoracic aorta is negative for dissection. RV/LV ratio abnormal, greater than 1.0.     LUNGS AND PLEURA: Fainted tree-in-bud opacities in the lower lobes bilaterally, unchanged compared to 10/18/2022.    MEDIASTINUM/AXILLAE: No lymphadenopathy. No thoracic aortic aneurysms.    CORONARY ARTERY CALCIFICATION: Mild.    HEPATOBILIARY: No significant mass or bile duct dilatation. No calcified gallstones. Unchanged tiny cyst in the right lobe.    PANCREAS: Normal.    SPLEEN: Normal.    ADRENAL GLANDS: Normal.    KIDNEYS/BLADDER: No significant mass, stones, or hydronephrosis. There are simple or benign  cysts. No follow up is needed. Bladder unremarkable.    BOWEL: Diverticulosis of the colon. No acute inflammatory change. No obstruction. Normal appendix.    LYMPH NODES: No enlarged lymph nodes. 4.4 cm simple fluid collection along the left external iliac region suggestive of lymphocele or seroma (12/196).    VASCULATURE: Mild atherosclerotic calcification. No aneurysm.    PELVIC ORGANS: Normal.    MUSCULOSKELETAL: Posterior fusion hardware in the lumbosacral spine. Mild degenerative changes elsewhere in the spine. No aggressive or destructive lesion. Healed right rib fractures. Postsurgical changes in the anterior abdominal wall.      Impression    IMPRESSION:  1.  Extensive acute pulmonary emboli, including saddle embolus involving the main pulmonary artery. Secondary signs of right heart strain with elevated RV/LV ratio and reflux of contrast into the IVC.    2.  No evidence of pulmonary infarct.    [Critical Result: New diagnosis of pulmonary embolism]    Finding was identified on 12/24/2022 10:37 AM.     Dr. Amilcar Tubbs was contacted by me on 12/24/2022 10:42 AM and verbalized understanding of the critical result.    CT Head w/o Contrast    Narrative    EXAM: CT HEAD W/O CONTRAST  LOCATION: [facility]  DATE/TIME: 12/24/2022 11:42 AM    INDICATION: Syncope, head injury.  COMPARISON: None.  TECHNIQUE: Routine CT Head without IV contrast. Multiplanar reformats. Dose reduction techniques were used.    FINDINGS:  INTRACRANIAL CONTENTS: There is intravenous contrast on board from a recent chest CT. No intracranial hemorrhage, extraaxial collection, or mass effect.  No CT evidence of acute infarct. Mild presumed chronic small vessel ischemic changes. Mild   generalized volume loss. No hydrocephalus.     VISUALIZED ORBITS/SINUSES/MASTOIDS: No intraorbital abnormality. No paranasal sinus mucosal disease. No middle ear or mastoid effusion.    BONES/SOFT TISSUES: No acute abnormality.       Impression    IMPRESSION:  1.  No CT evidence for acute intracranial process.  2.  Brain atrophy and presumed chronic microvascular ischemic changes as above.   US Lower Extremity Venous Duplex Bilateral    Narrative    EXAM: US LOWER EXTREMITY VENOUS DUPLEX BILATERAL  LOCATION: [facility]  DATE/TIME: 12/24/2022 10:59 AM    INDICATION: BL lower leg swelling, L>R, post op several weeks from L spine fusion  COMPARISON: None.  TECHNIQUE: Venous Duplex ultrasound of bilateral lower extremities with and without compression, augmentation and duplex. Color flow and spectral Doppler with waveform analysis performed.    FINDINGS: Exam includes the common femoral, femoral, popliteal veins as well as segmentally visualized deep calf veins and greater saphenous vein.     RIGHT: No deep vein thrombosis. No superficial thrombophlebitis. No popliteal cyst.    LEFT: Nonocclusive thrombus in the femoral vein in the upper and mid thigh and occlusive thrombus in the femoral vein in the distal thigh. Nonocclusive thrombus in the popliteal vein and tibioperoneal trunk. The posterior tibial and peroneal veins of the   calf are patent and negative for thrombus. No superficial thrombophlebitis. No popliteal cyst.      Impression    IMPRESSION:  1.  Positive for large burden of left lower extremity DVT in the femoral and popliteal veins and tibioperoneal trunk.   IR Pulmonary Angiogram Bilateral    Narrative    IR PULMONARY ANGIOGRAM BILATERAL   12/24/2022 3:40 PM     CLINICAL HISTORY/INDICATION: submassive pe, syncope, high risk    PROCEDURES PERFORMED:   1. Bilateral pulmonary angiography.  2. Bilateral pulmonary artery thrombectomy.  3. IVC filter placement    MEDICATIONS: 12 mL 1% lidocaine, 3 mg Versed IV, 150 mcg Fentanyl IV  4000 units heparin    CONTRAST: 140    FLUORO: 22.4 minutes    AIR KERMA: 565.0 to mGy    CONSENT: Following a discussion of the risks, benefits, indications  and alternatives to  treatment, appropriate informed consent was  obtained.      TIMEOUT: A timeout was performed per universal protocol policy to  ensure correct patient, site, and procedure to be performed.     PROCEDURE AND FINDINGS:  Following a discussion of the risks, benefits, indications, and  alternatives to treatment, appropriate informed consent was obtained  from the patient. The patient was brought to the interventional  radiology suite and placed supine on the table. Bilateral groins were  prepped and draped in a sterile fashion.  A timeout was performed per  universal protocol policy to confirm the correct patient, site and  procedure to be performed.    A preliminary ultrasound of the right  groin was performed and  demonstrates a patent right common femoral vein. A permanent  ultrasound image was recorded.  Using a combination of fluoroscopy and  ultrasound, an access site was determined.  The overlying skin was  anesthetized with 1% Lidocaine.  Using ultrasound guidance,  access  into the right common femoral vein was obtained with visualization of  needle entry into the vessel. A 0.018 wire was advanced through the  needle and exchange was made for a 5 Cameroonian micropuncture sheath. A  0.035 wire was advanced through the micropuncture sheath and exchange  was made for a 6 Cameroonian sheath and subsequently a 24 Cameroonian sheath.  A  total of 4000 Units Heparin IV was administered to maintain an ACT of  250-300.     A pigtail was passed across the right atrium into the right ventricle  and subsequently into the main pulmonary artery. The pigtail was  replaced with a 5 Cameroonian angiographic catheter and Bentson wire which  were maneuvered into a distal right lower lobe pulmonary arterial  branch. A stiff wire was placed. The 5 Cameroonian angiographic catheter  was removed. A 24 Cameroonian FloTriever device was then passed into the  right pulmonary artery centrally. Digital subtraction angiography was  performed, images showed large amount  of thrombus. The FloTriever was  then advanced into the right  main pulmonary artery to the level of  the embolus and mechanical thrombectomy was performed with removal of  large amount of pulmonary embolus. Post aspiration angiography was  performed, images showed improvement in flow in the right pulmonary  arterial tree.    The FloTriever was brought back into the main pulmonary artery. A 5  Prydeinig angiographic catheter and Bentson wire which were maneuvered  into a distal left lower lobe pulmonary arterial branch. A stiff wire  was placed. The 5 Prydeinig angiographic catheter was removed. A 24  Prydeinig FloTriever device was then passed into the left pulmonary  artery centrally. Digital subtraction angiography was performed,  images showed large amount of thrombus. The FloTriever was then  advanced into the left main pulmonary artery to the level of the  embolus and mechanical thrombectomy was performed with removal of  origin of of pulmonary embolus. Post aspiration angiography was  performed, images showed improvement in flow in the left pulmonary  arterial tree.    Because the patient has known left lower extremity DVT extending into  the proximal superficial femoral vein, it was elected to place an IVC  filter to minimize risk of additional future pulmonary emboli. An 8.5  Prydeinig sheath was maneuvered through the existing in a 24 Prydeinig  sheath into the IVC. An IVC venogram was performed. The renal vein  inflow was identified. Subsequently, a do not lead retrievable filter  was deployed with apex at the renal vein inflow. A fluoroscopic image  was saved to document position of the filter. All catheters and wires  were removed. The puncture site was closed with 2 probable glide  suture mediated closure devices. This was supplemented with a  pursestring suture.     Throughout the procedure, the patient was monitored by a radiology  nurse for cardiac rhythm and oxygen saturation which remained stable.  Total  moderate sedation time was 22.4 min. The patient tolerated the  procedure well and left interventional radiology in stable condition.      Impression    IMPRESSION: Bilateral pulmonary thrombectomy and IVC filter placed as  above.    MARANDA SAINZ MD         SYSTEM ID:  [id number]   IR IVC Filter Placement    Narrative    IR PULMONARY ANGIOGRAM BILATERAL 12/24/2022 3:40 PM     CLINICAL HISTORY/INDICATION: submassive pe, syncope, high risk    PROCEDURES PERFORMED:   1. Bilateral pulmonary angiography.  2. Bilateral pulmonary artery thrombectomy.  3. IVC filter placement    MEDICATIONS: 12 mL 1% lidocaine, 3 mg Versed IV, 150 mcg Fentanyl IV  4000 units heparin    CONTRAST: 140    FLUORO: 22.4 minutes    AIR KERMA: 565.0 to mGy    CONSENT: Following a discussion of the risks, benefits, indications  and alternatives to treatment, appropriate informed consent was  obtained.     TIMEOUT: A timeout was performed per universal protocol policy to  ensure correct patient, site, and procedure to be performed.     PROCEDURE AND FINDINGS:  Following a discussion of the risks, benefits, indications, and  alternatives to treatment, appropriate informed consent was obtained  from the patient. The patient was brought to the interventional  radiology suite and placed supine on the table. Bilateral groins were  prepped and draped in a sterile fashion. A timeout was performed per  universal protocol policy to confirm the correct patient, site and  procedure to be performed.    A preliminary ultrasound of the right groin was performed and  demonstrates a patent right common femoral vein. A permanent  ultrasound image was recorded. Using a combination of fluoroscopy and  ultrasound, an access site was determined. The overlying skin was  anesthetized with 1% Lidocaine. Using ultrasound guidance, access  into the right common femoral vein was obtained with visualization of  needle entry into the vessel. A 0.018 wire was advanced through  the  needle and exchange was made for a 5 Pakistani micropuncture sheath. A  0.035 wire was advanced through the micropuncture sheath and exchange  was made for a 6 Pakistani sheath and subsequently a 24 Pakistani sheath. A  total of 4000 Units Heparin IV was administered to maintain an ACT of  250-300.     A pigtail was passed across the right atrium into the right ventricle  and subsequently into the main pulmonary artery. The pigtail was  replaced with a 5 Pakistani angiographic catheter and Bentson wire which  were maneuvered into a distal right lower lobe pulmonary arterial  branch. A stiff wire was placed. The 5 Pakistani angiographic catheter  was removed. A 24 Pakistani FloTriever device was then passed into the  right pulmonary artery centrally. Digital subtraction angiography was  performed, images showed large amount of thrombus. The FloTriever was  then advanced into the right main pulmonary artery to the level of  the embolus and mechanical thrombectomy was performed with removal of  large amount of pulmonary embolus. Post aspiration angiography was  performed, images showed improvement in flow in the right pulmonary  arterial tree.    The FloTriever was brought back into the main pulmonary artery. A 5  Pakistani angiographic catheter and Bentson wire which were maneuvered  into a distal left lower lobe pulmonary arterial branch. A stiff wire  was placed. The 5 Pakistani angiographic catheter was removed. A 24  Pakistani FloTriever device was then passed into the left pulmonary  artery centrally. Digital subtraction angiography was performed,  images showed large amount of thrombus. The FloTriever was then  advanced into the left main pulmonary artery to the level of the  embolus and mechanical thrombectomy was performed with removal of  origin of of pulmonary embolus. Post aspiration angiography was  performed, images showed improvement in flow in the left pulmonary  arterial tree.    Because the patient has known left lower  extremity DVT extending into  the proximal superficial femoral vein, it was elected to place an IVC  filter to minimize risk of additional future pulmonary emboli. An 8.5  Sammarinese sheath was maneuvered through the existing in a 24 Sammarinese  sheath into the IVC. An IVC venogram was performed. The renal vein  inflow was identified. Subsequently, a do not lead retrievable filter  was deployed with apex at the renal vein inflow. A fluoroscopic image  was saved to document position of the filter. All catheters and wires  were removed. The puncture site was closed with 2 probable glide  suture mediated closure devices. This was supplemented with a  pursestring suture.     Throughout the procedure, the patient was monitored by a radiology  nurse for cardiac rhythm and oxygen saturation which remained stable.  Total moderate sedation time was 22.4 min. The patient tolerated the  procedure well and left interventional radiology in stable condition.      Impression    IMPRESSION: Bilateral pulmonary thrombectomy and IVC filter placed as  above.    MARANDA SAINZ MD         SYSTEM ID:  [id number]   Echocardiogram Complete     Value    LVEF  60-65%    Narrative    [id number]  [id number]  [id number]  [id number]     [facility]  Echocardiography Laboratory  [address] [place] [address]     Name: ANDREY VILLALBA  MRN: [id number]  : 1945  Study Date: 2022 09:30 AM  Age: 77 yrs  Gender: Male  Patient Location: [facility]  Reason For Study: Pulmonary Embolism  Ordering Physician: SOHA ROGERS  Referring Physician: None  Performed By: Concepción Zimmerman RDCS     BSA: 2.2 m2  Height: 71 in  Weight: 222 lb  HR: 76  BP: 117/62 mmHg  ______________________________________________________________________________  Procedure  Complete Portable Echo Adult.  ______________________________________________________________________________  Interpretation Summary     1. The left ventricle is normal in size.  There is mild concentric left  ventricular hypertrophy. Left ventricular systolic function is normal. The  visual ejection fraction is 60-65%. Diastolic Doppler findings (E/E' ratio  and/or other parameters) suggest left ventricular filling pressures are  normal. No regional wall motion abnormalities noted.  2. The right ventricle is normal size. The right ventricular systolic function  is mild to moderately reduced.  3. Trace to mild mitral and tricuspid regurgitation.  4. Mild dilatation of the aortic root and ascending thoracic aorta.  5. No pericardial effusion.  6. No previous study for comparison.  ______________________________________________________________________________  Left Ventricle  The left ventricle is normal in size. There is mild concentric left  ventricular hypertrophy. Left ventricular systolic function is normal. The  visual ejection fraction is 60-65%. Diastolic Doppler findings (E/E' ratio  and/or other parameters) suggest left ventricular filling pressures are  normal. No regional wall motion abnormalities noted.     Right Ventricle  The right ventricle is normal size. The right ventricular systolic function is  mild to moderately reduced.     Atria  Normal left atrial size. Right atrial size is normal.     Mitral Valve  There is trace mitral regurgitation.     Tricuspid Valve  There is mild (1+) tricuspid regurgitation. The right ventricular systolic  pressure is approximated at 31.6 mmHg plus the right atrial pressure. IVC  diameter <2.1 cm collapsing >50% with sniff suggests a normal RA pressure of 3  mmHg.     Aortic Valve  The aortic valve is trileaflet. There is mild (1+) aortic regurgitation. No  aortic stenosis is present.     Pulmonic Valve  There is no pulmonic valvular stenosis.     Vessels  Mild aortic root dilatation. The ascending aorta is Mildly dilated. The  inferior vena cava is normal.     Pericardium  There is no pericardial effusion.     Rhythm  Sinus rhythm was  noted.  ______________________________________________________________________________  MMode/2D Measurements & Calculations  IVSd: 1.3 cm     LVIDd: 4.3 cm  LVIDs: 2.3 cm  LVPWd: 1.3 cm  FS: 46.1 %  LV mass(C)d: 202.4 grams  LV mass(C)dI: 91.8 grams/m2  Ao root diam: 3.8 cm  LA dimension: 3.9 cm  asc Aorta Diam: 4.0 cm  LA/Ao: 1.0  LA Volume (BP): 55.0 ml  LA Volume Index (BP): 25.0 ml/m2  RWT: 0.59     Doppler Measurements & Calculations  MV E max lars: 73.9 cm/sec  MV A max lars: 76.5 cm/sec  MV E/A: 0.97  MV dec time: 0.36 sec  PA acc time: 0.07 sec  TR max lars: 281.0 cm/sec  TR max P.6 mmHg  E/E' av.7  Lateral E/e': 6.3  Medial E/e': 7.1     ______________________________________________________________________________  Report approved by: Latoya Munroe 2022 11:39 AM               Discharge Medications   Current Discharge Medication List      START taking these medications    Details   !! apixaban ANTICOAGULANT (ELIQUIS) 5 MG tablet Take 2 tablets (10 mg) by mouth 2 times daily for 7 days  Qty: 28 tablet, Refills: 0    Associated Diagnoses: Acute saddle pulmonary embolism with acute cor pulmonale (H)      !! apixaban ANTICOAGULANT (ELIQUIS) 5 MG tablet Take 1 tablet (5 mg) by mouth 2 times daily  Qty: 60 tablet, Refills: 0    Comments: Future refills by PCP  Physician No Ref-Primary with phone number None.  Associated Diagnoses: Acute saddle pulmonary embolism with acute cor pulmonale (H)       !! - Potential duplicate medications found. Please discuss with provider.      CONTINUE these medications which have NOT CHANGED    Details   ASCORBIC ACID PO Take 1 tablet by mouth daily      atorvastatin (LIPITOR) 40 MG tablet Take 1 tablet (40 mg) by mouth daily  Qty: 90 tablet, Refills: 0    Associated Diagnoses: Hyperlipidemia LDL goal <160      Cholecalciferol (VITAMIN D3 PO) Take 1 tablet by mouth daily      Multiple vitamin TABS Take 1 tablet by mouth daily      Omega-3 Fatty Acids (FISH OIL)  1200 MG capsule Take 1 capsule by mouth daily      senna-docusate (SENOKOT-S/PERICOLACE) 8.6-50 MG tablet Take 1 tablet by mouth daily  Qty: 60 tablet, Refills: 1    Associated Diagnoses: Acute post-operative pain      methocarbamol (ROBAXIN) 500 MG tablet Take 1 tablet (500 mg) by mouth 4 times daily as needed for muscle spasms  Qty: 60 tablet, Refills: 0    Associated Diagnoses: Acute post-operative pain      ondansetron (ZOFRAN) 4 MG tablet Take 1 tablet (4 mg) by mouth every 8 hours as needed for nausea  Qty: 30 tablet, Refills: 0    Associated Diagnoses: Acute post-operative pain         STOP taking these medications       aspirin (ASA) 81 MG chewable tablet Comments:   Reason for Stopping:             Allergies   No Known Allergies

## 2022-12-27 NOTE — PLAN OF CARE
[id number]      A/Ox4. VSS on RA. Reg diet. Hep drip infusing at 1500 units/hr, recheck at 0900 this AM. SBA w/ IV pole. Denies SOB. Continent of B/B. L ACE wrap removed for night shift, to be reapplied during days. Pedal pulses present with doppler. R groin dressing from IVC filter placement and on back from thrombectomy, CDI. Urine free of blood. Discharge once transitioned to PO anticoagulant.

## 2022-12-27 NOTE — PROGRESS NOTES
Patient discharged at 3:10 PM to home.  IV was discontinued.   Pain at time of discharge was 0/10.   Belongings returned to patient.    Discharge instructions and medications reviewed with patient and wife.  Patient and wife verbalized understanding and all questions were answered.   Prescriptions given to patient.   At time of discharge, patient condition was stable and left the unit via wheelchair escorted by nursing staff.

## 2022-12-28 ENCOUNTER — PATIENT OUTREACH (OUTPATIENT)
Dept: CARE COORDINATION | Facility: CLINIC | Age: 77
End: 2022-12-28

## 2022-12-28 NOTE — PROGRESS NOTES
Clinic Care Coordination Contact  Community Health Worker Initial Outreach    Patient accepts CC: No, Pt declined needs for extra support.     Clinic Care Coordination Contact  [facility]: Post-Discharge Note  SITUATION                                                      Admission:    Admission Date: 12/24/22   Reason for Admission: Saddle pulmonary embolism  Syncope secondary to above  Acute hypoxic respiratory failure secondary to saddle PE, resolved prior to discharge  Extensive left lower extremity DVT status post IVC filter  Urinary retention, acute postprocedure, resolved now  Gross hematuria  Acute blood loss anemia from hematuria, stable prior to discharge  Non-ST elevation MI, type II from right heart strain  Recent L5-S1 fusion  Hyperlipidemia  Discharge:   Discharge Date: 12/27/22  Discharge Diagnosis: Saddle pulmonary embolism  Syncope secondary to above  Acute hypoxic respiratory failure secondary to saddle PE, resolved prior to discharge  Extensive left lower extremity DVT status post IVC filter  Urinary retention, acute postprocedure, resolved now  Gross hematuria  Acute blood loss anemia from hematuria, stable prior to discharge  Non-ST elevation MI, type II from right heart strain  Recent L5-S1 fusion  Hyperlipidemia    BACKGROUND                                                      Per hospital discharge summary and inpatient provider notes:    Bryon Meyer is a 77 year old male admitted on 12/24/2022.  Past history of HLD and recent lumbar spinal fusion 11/22/22 who presents with dyspnea, LLE swelling and syncope, found to have LLE DVT and saddle PE.    ASSESSMENT           Discharge Assessment  How are you doing now that you are home?: doing well  How are your symptoms? (Red Flag symptoms escalate to triage hotline per guidelines): Improved  Do you feel your condition is stable enough to be safe at home until your provider visit?: Yes  Does the patient have their discharge instructions? :  Yes  Does the patient have questions regarding their discharge instructions? : No  Were you started on any new medications or were there changes to any of your previous medications? : Yes  Does the patient have all of their medications?: Yes  Do you have questions regarding any of your medications? : No  Do you have all of your needed medical supplies or equipment (DME)?  (i.e. oxygen tank, CPAP, cane, etc.): Yes  Discharge follow-up appointment scheduled within 14 calendar days? : Yes  Discharge Follow Up Appointment Date: 01/04/23  Discharge Follow Up Appointment Scheduled with?: Primary Care Provider    Post-op (W CTA Only)  If the patient had a surgery or procedure, do they have any questions for a nurse?: No         PLAN                                                      Outpatient Plan:      Follow-up Appointments     Follow-up and recommended labs and tests       Follow up with primary care provider, Physician No Ref-Primary, within 7   days for hospital follow- up.  The following labs/tests are recommended:   CBC.    Follow-up with interventional radiology regarding your IVC filter        Future Appointments   Date Time Provider Department Center   1/6/2023 10:30 AM Fabián Cesar MD [facility]       For any urgent concerns, please contact our 24 hour nurse triage line: [phone] ([address] [place] [address])       YANET Parker  [phone]  [facility]

## 2022-12-29 ENCOUNTER — TELEPHONE (OUTPATIENT)
Dept: OTHER | Facility: CLINIC | Age: 77
End: 2022-12-29

## 2022-12-29 LAB
BACTERIA BLD CULT: NO GROWTH
BACTERIA BLD CULT: NO GROWTH

## 2022-12-29 NOTE — TELEPHONE ENCOUNTER
February 18, 2019        Re: Daryn Mccracken  [address] [facility] [address]      To whom it may concern:    This is to certify that Daryn Mccracken was seen at [facility] on 2/18/2019 for a well visit.  Please excuse him from school.    Sincerely,          Radha TOVAR MD/ CASSANDRA Robins  [address] [address] [phone]                 Returned patient phone call and left VM.  Patient does have a purse string suture that can be removed at anytime.  Will try and arrange patient to come in to have it removed either Friday or Monday at the clinic.  Arleth Mancilla RN  IR nurse clinician  [phone]

## 2022-12-29 NOTE — TELEPHONE ENCOUNTER
[facility] VASCULAR [facility]    Who is the name of the provider?:  Rene    What is the location you see this provider at/preferred location?:   Person calling: Bryon Mitch  Phone number:  [phone]  Nurse call back needed:  YES     Reason for call:     Patient is wanting to know if he will be having sutures removed.  There is a bandage and he assumes there are sutures.    Please call patient.      After his questions are answered I can schedule his follow up on 1/30/23.    Pharmacy location:     Outside Imaging: NO   Can we leave a detailed message on this number?  YES

## 2022-12-29 NOTE — TELEPHONE ENCOUNTER
Patient returned phone call.  He does have a granddaughter who is a nurse and he is gonna see if she will remove the stitch.  He will call me back to arrange stitch removal if needed.  Arleth Mancilla RN  IR nurse clinician  [phone]

## 2022-12-30 ENCOUNTER — TELEPHONE (OUTPATIENT)
Dept: OTHER | Facility: CLINIC | Age: 77
End: 2022-12-30

## 2022-12-30 ENCOUNTER — APPOINTMENT (OUTPATIENT)
Dept: INTERVENTIONAL RADIOLOGY/VASCULAR | Facility: CLINIC | Age: 77
End: 2022-12-30
Attending: NURSE PRACTITIONER
Payer: MEDICARE

## 2022-12-30 ENCOUNTER — HOSPITAL ENCOUNTER (OUTPATIENT)
Facility: CLINIC | Age: 77
Discharge: HOME | End: 2022-12-30
Admitting: RADIOLOGY
Payer: MEDICARE

## 2022-12-30 DIAGNOSIS — I26.99 PULMONARY EMBOLISM, BILATERAL (H): ICD-10-CM

## 2022-12-30 PROCEDURE — G0463 HOSPITAL OUTPT CLINIC VISIT: HCPCS

## 2022-12-30 ASSESSMENT — ACTIVITIES OF DAILY LIVING (ADL): ADLS_ACUITY_SCORE: 35

## 2022-12-30 NOTE — PROGRESS NOTES
Bryon Meyer is here today for a right groin stitch removal.      After explaining the procedure and and answering questions the stitch was removed intact with out complications. Gauze and tape placed over the site.    Bryon and his wife were given instructions on dressing changes daily and covering with plastic while showering until the site is healed.     Extra gauze and tape was given to the wife for home use.     Greater than 15 minutes was spent with evaluation and management of this patient with more than half of that time discussing the above and coordinating care.     Thanks, [facility] Interventional Radiology CNP ([phone]) (phone [phone])

## 2022-12-30 NOTE — TELEPHONE ENCOUNTER
Left message regarding if granddaughter is removing the suture.  Arleth Mancilla RN  IR nurse clinician  [phone]

## 2023-01-05 NOTE — PROGRESS NOTES
Assessment & Plan     Acute saddle pulmonary embolism with acute cor pulmonale (H)  Status post chemical thrombectomy, initiation of anticoagulation, and IVC filter placement.  Clinically markedly improved.    Continue anticoagulation.  Lifelong versus 9 to 12 months.    We note IVC filter is present.  We will need to consider IR referral in the future for removal    NSTEMI (non-ST elevated myocardial infarction) (H)  Due to right heart strain and PE.  Consider repeat echo next visit in 3 months    Hyperlipidemia LDL goal <160  Continue statin, lipids next visit.    DDD (degenerative disc disease), lumbar  Status post fusion.  Follow-up with [facility]    IFG (impaired fasting glucose)  By outside history.  Can consider monitoring    Patient wishes to travel to [place] for a .  Medically I do not see a contraindication.  Frequent breaks during the car ride down or encouraged    RTC 3M for Echo, labs (cbc, lipids, a1c)      Review of external notes as documented elsewhere in note  Prescription drug management  45 minutes spent on the date of the encounter doing chart review, review of outside records, interpretation of tests, patient visit, documentation and discussion with family         Return in about 3 months (around 2023) for Follow up.    Fabián Cesar MD  [facility] ANURAG Slater is a 77 year old accompanied by his spouse, presenting for the following health issues:  Establish Care    Pt is new to me as well as the clinic.  Had been getting care through the [facility] system.  Till recently his past medical history included only dyslipidemia, and impaired fasting glucose.    He underwent an elective lumbar fusion in November.    Presented on the  to the emergency room with a syncopal episode while tying his shoes.    Workup revealed saddle PE, extensive LLE DVT.   Pt had IVC filter, and mechanical thrombectomy.    +R heart strain.            History of Present Illness        Reason for visit:  General health check up and follow up on high blood pressure    He eats 0-1 servings of fruits and vegetables daily.He consumes 1 sweetened beverage(s) daily.He exercises with enough effort to increase his heart rate 10 to 19 minutes per day.  He exercises with enough effort to increase his heart rate 3 or less days per week.   He is taking medications regularly.       Post Discharge Outreach 12/28/2022   Admission Date 12/24/2022   Reason for Admission Saddle pulmonary embolism  Syncope secondary to above  Acute hypoxic respiratory failure secondary to saddle PE, resolved prior to discharge  Extensive left lower extremity DVT status post IVC filter  Urinary retention, acute postprocedure, resolved now  Gross hematuria  Acute blood loss anemia from hematuria, stable prior to discharge  Non-ST elevation MI, type II from right heart strain  Recent L5-S1 fusion  Hyperlipidemia   Discharge Date 12/27/2022   Discharge Diagnosis Saddle pulmonary embolism  Syncope secondary to above  Acute hypoxic respiratory failure secondary to saddle PE, resolved prior to discharge  Extensive left lower extremity DVT status post IVC filter  Urinary retention, acute postprocedure, resolved now  Gross hematuria  Acute blood loss anemia from hematuria, stable prior to discharge  Non-ST elevation MI, type II from right heart strain  Recent L5-S1 fusion  Hyperlipidemia   How are you doing now that you are home? doing well   How are your symptoms? (Red Flag symptoms escalate to triage hotline per guidelines) Improved   Do you feel your condition is stable enough to be safe at home until your provider visit? Yes   Does the patient have their discharge instructions?  Yes   Does the patient have questions regarding their discharge instructions?  No   Were you started on any new medications or were there changes to any of your previous medications?  Yes   Does the patient have all of their medications? Yes   Do you have  "questions regarding any of your medications?  No   Do you have all of your needed medical supplies or equipment (DME)?  (i.e. oxygen tank, CPAP, cane, etc.) Yes   Discharge follow-up appointment scheduled within 14 calendar days?  Yes   Discharge Follow Up Appointment Date 1/4/2023   Discharge Follow Up Appointment Scheduled with? Primary Care Provider     Review of Systems         Objective    /69 (BP Location: Right arm, Patient Position: Chair, Cuff Size: Adult Large)   Pulse 76   Temp 98.1  F (36.7  C) (Temporal)   Resp 16   Ht 1.803 m (5' 11\")   Wt 99.3 kg (219 lb)   SpO2 98%   BMI 30.54 kg/m    Body mass index is 30.54 kg/m .  Physical Exam   GENERAL: healthy, alert and no distress  NECK: no adenopathy, no asymmetry, masses, or scars and thyroid normal to palpation  RESP: lungs clear to auscultation - no rales, rhonchi or wheezes  CV: regular rate and rhythm, normal S1 S2, no S3 or S4, no murmur, click or rub, no peripheral edema and peripheral pulses strong  ABDOMEN: soft, nontender, no hepatosplenomegaly, no masses and bowel sounds normal  MS: no gross musculoskeletal defects noted, no edema                    "

## 2023-01-06 ENCOUNTER — OFFICE VISIT (OUTPATIENT)
Dept: FAMILY MEDICINE | Facility: CLINIC | Age: 78
End: 2023-01-06
Payer: MEDICARE

## 2023-01-06 VITALS
DIASTOLIC BLOOD PRESSURE: 69 MMHG | WEIGHT: 219 LBS | HEART RATE: 76 BPM | SYSTOLIC BLOOD PRESSURE: 128 MMHG | OXYGEN SATURATION: 98 % | RESPIRATION RATE: 16 BRPM | TEMPERATURE: 98.1 F | HEIGHT: 71 IN | BODY MASS INDEX: 30.66 KG/M2

## 2023-01-06 DIAGNOSIS — I26.02 ACUTE SADDLE PULMONARY EMBOLISM WITH ACUTE COR PULMONALE (H): Primary | ICD-10-CM

## 2023-01-06 DIAGNOSIS — I21.4 NSTEMI (NON-ST ELEVATED MYOCARDIAL INFARCTION) (H): ICD-10-CM

## 2023-01-06 DIAGNOSIS — E78.5 HYPERLIPIDEMIA LDL GOAL <160: ICD-10-CM

## 2023-01-06 DIAGNOSIS — M51.369 DDD (DEGENERATIVE DISC DISEASE), LUMBAR: ICD-10-CM

## 2023-01-06 DIAGNOSIS — R73.01 IFG (IMPAIRED FASTING GLUCOSE): ICD-10-CM

## 2023-01-06 PROCEDURE — 99215 OFFICE O/P EST HI 40 MIN: CPT | Performed by: INTERNAL MEDICINE

## 2023-01-06 ASSESSMENT — PAIN SCALES - GENERAL: PAINLEVEL: NO PAIN (0)

## 2023-01-30 ENCOUNTER — TELEPHONE (OUTPATIENT)
Dept: OTHER | Facility: CLINIC | Age: 78
End: 2023-01-30
Payer: MEDICARE

## 2023-01-30 ENCOUNTER — HOSPITAL ENCOUNTER (OUTPATIENT)
Dept: ULTRASOUND IMAGING | Facility: CLINIC | Age: 78
Discharge: HOME | End: 2023-01-30
Attending: RADIOLOGY
Payer: MEDICARE

## 2023-01-30 ENCOUNTER — OFFICE VISIT (OUTPATIENT)
Dept: OTHER | Facility: CLINIC | Age: 78
End: 2023-01-30
Attending: RADIOLOGY
Payer: MEDICARE

## 2023-01-30 VITALS — OXYGEN SATURATION: 97 % | SYSTOLIC BLOOD PRESSURE: 149 MMHG | DIASTOLIC BLOOD PRESSURE: 75 MMHG | HEART RATE: 64 BPM

## 2023-01-30 DIAGNOSIS — I26.02 ACUTE SADDLE PULMONARY EMBOLISM WITH ACUTE COR PULMONALE (H): Primary | ICD-10-CM

## 2023-01-30 DIAGNOSIS — I26.02 ACUTE SADDLE PULMONARY EMBOLISM WITH ACUTE COR PULMONALE (H): ICD-10-CM

## 2023-01-30 PROCEDURE — G0463 HOSPITAL OUTPT CLINIC VISIT: HCPCS

## 2023-01-30 PROCEDURE — 93971 EXTREMITY STUDY: CPT | Mod: LT

## 2023-01-30 NOTE — TELEPHONE ENCOUNTER
----- Message from Adrianna Mancilla RN sent at 1/30/2023  3:18 PM CST -----  Alberto Cesar  This gentlemen is almost out of Eliquis.  He is not scheduled to see you until April.  Are you able to refill this patient prescription?  Please advise.  Arleth Mancilla RN  IR nurse clinician  [phone]

## 2023-01-30 NOTE — PROGRESS NOTES
Patient is here to discuss follow up    BP (!) 149/75 (BP Location: Left arm, Patient Position: Chair, Cuff Size: Adult Regular)   Pulse 64   SpO2 97%     Questions patient would like addressed today are: N/A.    Refills are needed: Yes:  Eliquis    Has homecare services and agency name:  Radha GUERRERO

## 2023-01-30 NOTE — RESULT ENCOUNTER NOTE
Results discussed directly with patient while patient was present. Any further details documented in the note.   Adrianna Mancilla RN

## 2023-01-31 NOTE — PROGRESS NOTES
VASCULAR OUTPATIENT CONSULT OR VISIT  PHYSICIAN:  Dr. Edy López      LOCATION: [facility]    Bryon Meyer   Medical Record #:  [id number]  YOB: 1945  Age:  77 year old     Date of Service: 1/30/2023    PRIMARY CARE PROVIDER: Fabián Cesar      HPI:  Bryon Meyer is a 77 year old male with a past medical history of hyperlipidemia, lumbar spinal fusion who presented to the [facility] on 12/24/2022 emergency room with dyspnea, left lower extremity swelling and syncopal episode.  CT chest PE protocol was performed that showed extensive acute pulmonary emboli, including saddle embolus involving the main pulmonary artery.  There was secondary signs of right heart strain with elevated RV/LV ratio and reflux of contrast into the IVC.  Bilateral lower extremity venous duplex showed large burden of left lower extremity DVT in the femoral and popliteal veins and tibial peroneal trunk.  On 12/24/2022 the patient underwent a successful pulmonary thrombectomy and IVC filter placement.  Today, the patient states he is doing well.  He is back to walking without shortness of breath.  He does continue to have some mild swelling in the left lower extremity.  He does have his back brace in place today with follow-up with the spine surgeon in the near future.  He currently is on Eliquis with no reports of bleeding or concerns.  He does have scheduled follow-up with his PCP.  They will need to refill his prescription within the week.  A message was sent to his PCP by my nurse.      PHH:    Past Medical History:   Diagnosis Date     Acute deep vein thrombosis (DVT) of left lower extremity (H)      Acute saddle pulmonary embolism with acute cor pulmonale (H) 12/24/2022     Hyperlipidemia LDL goal <160         Past Surgical History:   Procedure Laterality Date     COLONOSCOPY  2019     FUSION LUMBAR ANTERIOR ONE LEVEL N/A 11/22/2022    Procedure: LUMBAR 5 TO SACRAL 1 ANTERIOR  LUMBAR INTERBODY FUSION;  Surgeon: Luis De La Fuente MD;  Location: [facility] OR     FUSION, SPINE, LUMBAR, 2 LEVELS, POSTERIOR APPROACH, ROBOTIC-ASSISTED N/A 11/22/2022    Procedure: WITH POSTERIOR INSTRUMENTED SPINAL FUSION, POSSIBLE REMOVAL OF HARDWARE of previous posterior lumbar instrumentation lumbar 3 to lumbar 5;  Surgeon: Luis De La Fuente MD;  Location: [facility] OR     HERNIA REPAIR  2012     IR FOLLOW UP VISIT OUTPATIENT  12/30/2022     IR IVC FILTER PLACEMENT  12/24/2022     IR PULMONARY ANGIOGRAM BILATERAL  12/24/2022     ORTHOPEDIC SURGERY  2018    lumbar  back       ALLERGIES:  Patient has no known allergies.    MEDS:    Current Outpatient Medications:      atorvastatin (LIPITOR) 40 MG tablet, Take 1 tablet (40 mg) by mouth daily, Disp: 90 tablet, Rfl: 0     Cholecalciferol (VITAMIN D3 PO), Take 1 tablet by mouth daily, Disp: , Rfl:      Multiple vitamin TABS, Take 1 tablet by mouth daily, Disp: , Rfl:      Omega-3 Fatty Acids (FISH OIL) 1200 MG capsule, Take 1 capsule by mouth daily, Disp: , Rfl:      apixaban ANTICOAGULANT (ELIQUIS) 5 MG tablet, Take 1 tablet (5 mg) by mouth 2 times daily, Disp: 180 tablet, Rfl: 1    SOCIAL HABITS:    History   Smoking Status     Never   Smokeless Tobacco     Never     Social History    Substance and Sexual Activity      Alcohol use: Not Currently      History   Drug Use Unknown       FAMILY HISTORY:    Family History   Problem Relation Age of Onset     Other - See Comments Mother         101 yo     Throat cancer Father 78     Other Cancer Sister         COPD  Smoker     Other Cancer Brother         prostate       REVIEW OF SYSTEMS:    A 12 point ROS was reviewed and except for what is listed in the HPI above, all others are negative    PE:  BP (!) 149/75 (BP Location: Left arm, Patient Position: Chair, Cuff Size: Adult Regular)   Pulse 64   SpO2 97%   Wt Readings from Last 1 Encounters:   01/06/23 219 lb (99.3 kg)     There is no height or weight on file to calculate  BMI.    EXAM:  GENERAL: This is a well-developed 77 year old male who appears his stated age  EYES: Grossly normal.  MOUTH: Buccal mucosa normal   MUSCULOSKELETAL: Grossly normal and both lower extremities are intact.  HEME/LYMPH: No lymphedema  NEUROLOGIC: Focally intact, Alert and oriented x 3.   PSYCH: appropriate affect  INTEGUMENT: No open lesions or ulcers  Lower extremity: Left calf circumference is 43 cm right calf circumference is 41 cm.  Distal pulses are palpable        DIAGNOSTIC STUDIES:     Images:  US Lower Extremity Venous Duplex Left    Result Date: 1/30/2023  VENOUS ULTRASOUND LEFT LOWER EXTREMITY  1/30/2023 2:30 PM HISTORY: Status post left deep venous thrombosis, pulmonary thrombectomy and IVC filter placement on 12/24/2022. One month followup. Acute saddle pulmonary embolism with acute cor pulmonale (H). COMPARISON: December 24, 2022 TECHNIQUE: Color Doppler and spectral waveform analysis performed throughout the deep veins of the left lower extremity. FINDINGS: The visualized left external iliac, common femoral, proximal greater saphenous, and femoral veins demonstrate normal blood flow, compression, and augmentation. Nonocclusive thrombus in the popliteal veins, extending into the tibioperoneal trunk. Posterior tibial and peroneal veins are compressible. Contralateral right common femoral vein is patent.     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 MARY CARABALLO MD   SYSTEM ID:  [id number]        LABS:      Sodium   Date Value Ref Range Status   12/25/2022 140 133 - 144 mmol/L Final   12/24/2022 139 133 - 144 mmol/L Final   11/21/2022 142 136 - 145 mmol/L Final     Urea Nitrogen   Date Value Ref Range Status   12/25/2022 10 7 - 30 mg/dL Final   12/24/2022 9 7 - 30 mg/dL Final   11/21/2022 14.2 8.0 - 23.0 mg/dL Final   11/17/2022 12.0 8.0 - 23.0 mg/dL Final     Hemoglobin   Date Value Ref Range  Status   12/27/2022 11.7 (L) 13.3 - 17.7 g/dL Final   12/26/2022 13.4 13.3 - 17.7 g/dL Final   12/26/2022 11.9 (L) 13.3 - 17.7 g/dL Final     Platelet Count   Date Value Ref Range Status   12/27/2022 171 150 - 450 10e3/uL Final   12/26/2022 178 150 - 450 10e3/uL Final   12/25/2022 168 150 - 450 10e3/uL Final     Assessment/plan:  This is a pleasant 77-year-old male who underwent a successful pulmonary thrombectomy on 12/24/2022.  We reviewed the results of his ultrasound left lower extremity Doppler that was performed today.  There is chronic nonocclusive DVT in the left popliteal and tibial peroneal trunk venous segments, however overall improved from previous exam.  Recommendation we can proceed with IVC filter removal in the near future.  The patient is planning to travel to [place] in March would like to wait till he returns.  This is a reasonable request.  The patient has been instructed to contact us if he develops any concerns or swelling.  He has been advised to wear his compression stockings while up as well as continue on anticoagulation.  This was a in person visit in which 30 minutes of  total time was spent (either in face-to-face or non-face-to-face time).    Dr. Edy López   Interventional Radiology  Pager# [phone]  [facility]

## 2023-02-26 DIAGNOSIS — E78.5 HYPERLIPIDEMIA LDL GOAL <160: ICD-10-CM

## 2023-02-27 RX ORDER — ATORVASTATIN CALCIUM 40 MG/1
TABLET, FILM COATED ORAL
Qty: 90 TABLET | Refills: 0 | Status: SHIPPED | OUTPATIENT
Start: 2023-02-27 | End: 2023-04-07

## 2023-03-14 ENCOUNTER — TELEPHONE (OUTPATIENT)
Dept: OTHER | Facility: CLINIC | Age: 78
End: 2023-03-14
Payer: MEDICARE

## 2023-03-14 NOTE — TELEPHONE ENCOUNTER
Patient called to speak anita Rodas, wants to maybe schedule  a follow up appt w Dr López maybe, wants a call back to speak anita Rodas

## 2023-03-17 ENCOUNTER — TELEPHONE (OUTPATIENT)
Dept: INTERVENTIONAL RADIOLOGY/VASCULAR | Facility: CLINIC | Age: 78
End: 2023-03-17
Payer: MEDICARE

## 2023-03-17 NOTE — TELEPHONE ENCOUNTER
Received a call from Bryon Meyer ready to schedule the IVC filter removal and needing to renew the Eliquis prescription.     Reviewed the chart notes. The Eliquis is managed by his PCP so I requested he contact Dr Cesar. Arleth CHEEMA RN Clinic Coordinator will contact him next week to schedule the IVC filter removal.     Order for procedure entered.     Thanks, Bertha [facility] Interventional Radiology CNP ([phone]) (phone [phone])

## 2023-03-20 NOTE — TELEPHONE ENCOUNTER
Contacted patient, discussed scheduling IVC filter removal.  Discussed do not need to repeat lower extremity ultrasound, no new swelling, ultrasound showed chronic thrombus, patient is on Eliquis.  Scheduled for IVC filter removal 3/28: check in at 6:30 am  Reviewed NPO status, can take all am meds including Eliquis with a sip of water.  CNP will update pre-op.  Mailed instructions to patient  Arleth Mancilla RN  IR nurse clinician  [phone]

## 2023-03-28 ENCOUNTER — HOSPITAL ENCOUNTER (OUTPATIENT)
Facility: CLINIC | Age: 78
Discharge: HOME | End: 2023-03-28
Admitting: RADIOLOGY
Payer: MEDICARE

## 2023-03-28 ENCOUNTER — APPOINTMENT (OUTPATIENT)
Dept: INTERVENTIONAL RADIOLOGY/VASCULAR | Facility: CLINIC | Age: 78
End: 2023-03-28
Attending: NURSE PRACTITIONER
Payer: MEDICARE

## 2023-03-28 VITALS
TEMPERATURE: 97.9 F | DIASTOLIC BLOOD PRESSURE: 74 MMHG | SYSTOLIC BLOOD PRESSURE: 140 MMHG | BODY MASS INDEX: 31.5 KG/M2 | OXYGEN SATURATION: 99 % | HEIGHT: 71 IN | HEART RATE: 54 BPM | RESPIRATION RATE: 16 BRPM | WEIGHT: 225 LBS

## 2023-03-28 DIAGNOSIS — I26.02 ACUTE SADDLE PULMONARY EMBOLISM WITH ACUTE COR PULMONALE (H): Primary | ICD-10-CM

## 2023-03-28 DIAGNOSIS — I26.99 PULMONARY EMBOLISM, BILATERAL (H): ICD-10-CM

## 2023-03-28 LAB
ANION GAP SERPL CALCULATED.3IONS-SCNC: 8 MMOL/L (ref 7–15)
APTT PPP: 33 SECONDS (ref 22–38)
BUN SERPL-MCNC: 8.7 MG/DL (ref 8–23)
CALCIUM SERPL-MCNC: 9 MG/DL (ref 8.8–10.2)
CHLORIDE SERPL-SCNC: 107 MMOL/L (ref 98–107)
CREAT SERPL-MCNC: 0.99 MG/DL (ref 0.67–1.17)
DEPRECATED HCO3 PLAS-SCNC: 27 MMOL/L (ref 22–29)
ERYTHROCYTE [DISTWIDTH] IN BLOOD BY AUTOMATED COUNT: 14.9 % (ref 10–15)
GFR SERPL CREATININE-BSD FRML MDRD: 79 ML/MIN/1.73M2
GLUCOSE SERPL-MCNC: 106 MG/DL (ref 70–99)
HCT VFR BLD AUTO: 43.1 % (ref 40–53)
HGB BLD-MCNC: 14.4 G/DL (ref 13.3–17.7)
INR PPP: 1.1 (ref 0.85–1.15)
MCH RBC QN AUTO: 28.4 PG (ref 26.5–33)
MCHC RBC AUTO-ENTMCNC: 33.4 G/DL (ref 31.5–36.5)
MCV RBC AUTO: 85 FL (ref 78–100)
PLATELET # BLD AUTO: 213 10E3/UL (ref 150–450)
POTASSIUM SERPL-SCNC: 3.9 MMOL/L (ref 3.4–5.3)
RBC # BLD AUTO: 5.07 10E6/UL (ref 4.4–5.9)
SODIUM SERPL-SCNC: 142 MMOL/L (ref 136–145)
WBC # BLD AUTO: 4 10E3/UL (ref 4–11)

## 2023-03-28 PROCEDURE — 250N000011 HC RX IP 250 OP 636: Performed by: RADIOLOGY

## 2023-03-28 PROCEDURE — C1773 RET DEV, INSERTABLE: HCPCS

## 2023-03-28 PROCEDURE — 36591 DRAW BLOOD OFF VENOUS DEVICE: CPT

## 2023-03-28 PROCEDURE — 85610 PROTHROMBIN TIME: CPT | Performed by: RADIOLOGY

## 2023-03-28 PROCEDURE — 99152 MOD SED SAME PHYS/QHP 5/>YRS: CPT

## 2023-03-28 PROCEDURE — 250N000009 HC RX 250: Performed by: NURSE PRACTITIONER

## 2023-03-28 PROCEDURE — 258N000003 HC RX IP 258 OP 636: Performed by: RADIOLOGY

## 2023-03-28 PROCEDURE — 36415 COLL VENOUS BLD VENIPUNCTURE: CPT | Performed by: RADIOLOGY

## 2023-03-28 PROCEDURE — 250N000011 HC RX IP 250 OP 636: Performed by: NURSE PRACTITIONER

## 2023-03-28 PROCEDURE — 255N000002 HC RX 255 OP 636: Performed by: RADIOLOGY

## 2023-03-28 PROCEDURE — 85014 HEMATOCRIT: CPT | Performed by: RADIOLOGY

## 2023-03-28 PROCEDURE — C1769 GUIDE WIRE: HCPCS

## 2023-03-28 PROCEDURE — 80048 BASIC METABOLIC PNL TOTAL CA: CPT | Performed by: RADIOLOGY

## 2023-03-28 PROCEDURE — 272N000196 HC ACCESSORY CR5

## 2023-03-28 PROCEDURE — 85730 THROMBOPLASTIN TIME PARTIAL: CPT | Performed by: RADIOLOGY

## 2023-03-28 PROCEDURE — 999N000163 HC STATISTIC SIMPLE TUBE INSERTION/CHARGE, PORT, CATH, FISTULOGRAM

## 2023-03-28 RX ORDER — NALOXONE HYDROCHLORIDE 0.4 MG/ML
0.2 INJECTION, SOLUTION INTRAMUSCULAR; INTRAVENOUS; SUBCUTANEOUS
Status: DISCONTINUED | OUTPATIENT
Start: 2023-03-28 | End: 2023-03-28 | Stop reason: HOSPADM

## 2023-03-28 RX ORDER — IOPAMIDOL 612 MG/ML
50 INJECTION, SOLUTION INTRAVASCULAR ONCE
Status: COMPLETED | OUTPATIENT
Start: 2023-03-28 | End: 2023-03-28

## 2023-03-28 RX ORDER — LIDOCAINE 40 MG/G
CREAM TOPICAL
Status: DISCONTINUED | OUTPATIENT
Start: 2023-03-28 | End: 2023-03-28 | Stop reason: HOSPADM

## 2023-03-28 RX ORDER — HEPARIN SODIUM 200 [USP'U]/100ML
1 INJECTION, SOLUTION INTRAVENOUS CONTINUOUS PRN
Status: DISCONTINUED | OUTPATIENT
Start: 2023-03-28 | End: 2023-03-28 | Stop reason: HOSPADM

## 2023-03-28 RX ORDER — NALOXONE HYDROCHLORIDE 0.4 MG/ML
0.4 INJECTION, SOLUTION INTRAMUSCULAR; INTRAVENOUS; SUBCUTANEOUS
Status: DISCONTINUED | OUTPATIENT
Start: 2023-03-28 | End: 2023-03-28 | Stop reason: HOSPADM

## 2023-03-28 RX ORDER — FLUMAZENIL 0.1 MG/ML
0.2 INJECTION, SOLUTION INTRAVENOUS
Status: DISCONTINUED | OUTPATIENT
Start: 2023-03-28 | End: 2023-03-28 | Stop reason: HOSPADM

## 2023-03-28 RX ORDER — FENTANYL CITRATE 50 UG/ML
25-50 INJECTION, SOLUTION INTRAMUSCULAR; INTRAVENOUS EVERY 5 MIN PRN
Status: DISCONTINUED | OUTPATIENT
Start: 2023-03-28 | End: 2023-03-28 | Stop reason: HOSPADM

## 2023-03-28 RX ORDER — SODIUM CHLORIDE 9 MG/ML
INJECTION, SOLUTION INTRAVENOUS CONTINUOUS
Status: DISCONTINUED | OUTPATIENT
Start: 2023-03-28 | End: 2023-03-28 | Stop reason: HOSPADM

## 2023-03-28 RX ORDER — ACETAMINOPHEN 325 MG/1
650 TABLET ORAL
Status: DISCONTINUED | OUTPATIENT
Start: 2023-03-28 | End: 2023-03-28 | Stop reason: HOSPADM

## 2023-03-28 RX ADMIN — LIDOCAINE HYDROCHLORIDE 9 ML: 10 INJECTION, SOLUTION INFILTRATION; PERINEURAL at 08:44

## 2023-03-28 RX ADMIN — FENTANYL CITRATE 50 MCG: 50 INJECTION, SOLUTION INTRAMUSCULAR; INTRAVENOUS at 08:45

## 2023-03-28 RX ADMIN — SODIUM CHLORIDE: 9 INJECTION, SOLUTION INTRAVENOUS at 07:08

## 2023-03-28 RX ADMIN — IOPAMIDOL 20 ML: 612 INJECTION, SOLUTION INTRAVENOUS at 09:00

## 2023-03-28 RX ADMIN — HEPARIN SODIUM 1 BAG: 200 INJECTION, SOLUTION INTRAVENOUS at 08:42

## 2023-03-28 RX ADMIN — MIDAZOLAM 1 MG: 1 INJECTION INTRAMUSCULAR; INTRAVENOUS at 08:40

## 2023-03-28 ASSESSMENT — ACTIVITIES OF DAILY LIVING (ADL)
ADLS_ACUITY_SCORE: 35
ADLS_ACUITY_SCORE: 35

## 2023-03-28 NOTE — PROGRESS NOTES
Care Suites Admission Nursing Note    Patient Information  Name: Bryon Meyer  Age: 77 year old  Reason for admission: IVC Filter removal  Care Suites arrival time: 0630    Visitor Information  Name: Cassidy  Informed of visitor restrictions: Yes  2 visitor allowed per patient   Visitor must wear a mask    Patient Admission/Assessment   Pre-procedure assessment complete: Yes  If abnormal assessment/labs, provider notified: N/A  NPO: Yes  Medications held per instructions/orders: N/A  Consent: obtained  If applicable, pregnancy test status: deferred  Patient oriented to room: Yes  Education/questions answered: Yes  Plan/other: proceed as scheduled    Discharge Planning  Discharge name/phone number: Cassidy [phone]  Overnight post sedation caregiver: Cassidy  Discharge location: home    Francy Maurice RN

## 2023-03-28 NOTE — PROGRESS NOTES
Care Suites Post Procedure Note    Patient Information  Name: Bryon Meyer  Age: 77 year old    Post Procedure  Time patient returned to Care Suites: 0909  Concerns/abnormal assessment: NA  If abnormal assessment, provider notified: N/A  Plan/Other: post care as ordered.  Denies pain, right neck site CDI/soft, sedation bedrest.     Francy Maurice RN

## 2023-03-28 NOTE — PRE-PROCEDURE
GENERAL PRE-PROCEDURE:   Procedure:  Venogram, temporary inferior vena cava filter removal with moderate sedation  Date/Time:  3/28/2023 7:29 AM    Written consent obtained?: Yes    Risks and benefits: Risks, benefits and alternatives were discussed    Consent given by:  Patient  Patient states understanding of procedure being performed: Yes    Patient's understanding of procedure matches consent: Yes    Procedure consent matches procedure scheduled: Yes    Expected level of sedation:  Moderate  Appropriately NPO:  Yes  ASA Class:  3  Mallampati  :  Grade 3- soft palate visible, posterior pharyngeal wall not visible  Lungs:  Lungs clear with good breath sounds bilaterally and other (comment)  Lung exam comment:  Decreased in bases   Heart:  Normal heart sounds and rate  History & Physical reviewed:  History and physical reviewed and no updates needed  Statement of review:  I have reviewed the lab findings, diagnostic data, medications, and the plan for sedation  Abbreviated H and P for Temporary IVC filter removal with IV Moderate Sedation    Reason for Procedure: H/o PE thrombectomy with IVC filter placement, stable on anticoagulation, not needed anymore    PMH:  Past Medical History:   Diagnosis Date     Acute deep vein thrombosis (DVT) of left lower extremity (H)      Acute saddle pulmonary embolism with acute cor pulmonale (H) 12/24/2022     Hyperlipidemia LDL goal <160      (ROS details in pre procedure assessment)    History of sleep apnea? No  History of problems with sedation? None  History of blood thinners? Yes, Eliquis    NPO? Yes  Current smoker? No  Negative for recent fever, cough, chest or sinus congestion, SOB, weight loss, chest pain, diarrhea or constipation.     Focused Physical exam pertinent to procedure:          (Details of heart, lung and mallampati assessment in pre procedure assessment)  General-Temp:  [97.9  F (36.6  C)] 97.9  F (36.6  C)  Pulse:  [61] 61  Resp:  [18] 18  BP: (140)/(76)  140/76  SpO2:  [97 %] 97 %    Other: Feeling well. Back surgery pre ceded DVT and PE. Back is healing well. No new left leg swelling. Walking at least one mile a day on treadmill and was daily walking with his wife when he was in [place] without any issues.     A/P:Reviewed history, medications, allergies, clinical information and pre procedure assessment with Dr López.  Patient procedural education done by myself. Bryon is approved for IV moderate sedation for the above procedure and will proceed when called for.      Total time: 25 minutes    Thank you  Bertha [facility] Interventional Radiology CNP ([phone]) (phone [phone])

## 2023-03-28 NOTE — DISCHARGE INSTRUCTIONS
IVC Filter Removal Discharge Instructions - Neck    After you go home:    Have an adult stay with you until tomorrow.  Drink extra fluids for 2 days.  You may resume your normal diet.  No smoking       For 24 hours - due to the sedation you received:  Relax and take it easy.  Do NOT make any important or legal decisions.  Do NOT drive or operate machines at home or at work.  Do NOT drink alcohol.    Care of Neck Puncture Site:    For the first 24 hrs - check the puncture site every 1-2 hours while awake.  Remove the bandaid after 24 hours. If there is minor oozing, apply another bandaid and remove it after 12 hours.  It is normal to have a small bruise or soreness at the site.  You may shower tomorrow. Do NOT take a bath, or use a hot tub or pool for at least 3 days. Do NOT scrub the site. Do not use lotion or powder near the puncture site.    Activity:            For 2 days:  Do NOT do any heavy activity such as exercise, lifting, or straining.  No housework, yard work or any activity that make you sweat  Do NOT lift more than 10 pounds  Avoid heavy lifting or the overuse of your shoulder for three days.           Bleeding:    If you start bleeding from the site in your neck, sit down and press gently on the site for 10 minutes.   Once bleeding stops, sit still for 2 hours.   Call [facility] as soon as you can.       Call 911 right away if you have heavy bleeding or bleeding that does not stop.      Medicines:       Take your medications, including blood thinners, unless your provider tells you not to.    If you have stopped any medicines, check with your provider about when to restart them.    Follow Up Appointments:    Follow up with your primary care provider as needed.    Call the clinic if:    You have increased pain or a large or growing hard lump around the site.  The site is red, swollen, hot or tender.  Blood or fluid is draining from the site.  You have chills or a fever greater than 101 F  (38 C).  You have hives, a rash or unusual itching.  Any questions or concerns.        If you have questions or your original symptoms do not improve, call:        [facility] @ [phone]

## 2023-03-28 NOTE — PROGRESS NOTES
Care Suites Discharge Nursing Note    Patient Information  Name: Bryon Meyer  Age: 77 year old    Discharge Education:  Discharge instructions reviewed: Yes  Additional education/resources provided: NA  Patient/patient representative verbalizes understanding: Yes  Patient discharging on new medications: No  Medication education completed: N/A    Discharge Plans:   Discharge location: home  Discharge ride contacted: Yes  Approximate discharge time: 1000    Discharge Criteria:  Discharge criteria met and vital signs stable: Yes. Right lower neck site CDI/soft, denies pain, PIV pulled.    Patient Belongs:  Patient belongings returned to patient: Yes    Francy Maurice, RN

## 2023-03-28 NOTE — IR NOTE
Interventional Radiology Intra-procedural Nursing Note    Patient Name: Bryon Meyer  Medical Record Number: [id number]  Today's Date: March 28, 2023    Procedure: Venogram, temporary inferior vena cava filter removal with moderate sedation  Start time: 843  End time: 857  Report provided to: YANI RN  Patient depart time and location: 905  to CS21    Note: Patient entered Interventional Radiology Suite number 2 via cart. Patient awake, alert and oriented. Assisted onto procedural table in supine position. Prepped and draped.  Dr. López in room. Time out and procedure started. Vital signs stable. Telemetry reading SB.    Procedure well tolerated by patient without complications. Procedure end with debrief by Dr. López.  Pressure held until hemostasis achieved. Quick clot and tegaderm dressing applied to RIJ.    Administered medication totals:  Lidocaine 1% 9 mL Intradermal  Versed 1 mg IVP  Fentanyl 50 mcg IVP    Last dose of sedation administered at 845 .

## 2023-04-06 NOTE — PROGRESS NOTES
"  Assessment & Plan     Acute saddle pulmonary embolism with acute cor pulmonale (H)  First occurrence.  Provoked after spine surgery.  Is rather large with strain and thrombectomy.    By criteria he still considered low risk for recurrence.  We will have the patient return to clinic in 6 months.  At that point I think we can have a good conversation about discontinuing anticoagulation  - apixaban ANTICOAGULANT (ELIQUIS) 5 MG tablet  Dispense: 180 tablet; Refill: 1    Hyperlipidemia LDL goal <160  He is having some hot flashes at night.  Wondering if it might be related to the statin.  We will reduce the dose and assess response.  - Lipid panel reflex to direct LDL Non-fasting  - atorvastatin (LIPITOR) 20 MG tablet  Dispense: 90 tablet; Refill: 1    Need for hepatitis C screening test    - Hepatitis C Screen Reflex to HCV RNA Quant and Genotype    Bilateral sensorineural hearing loss  He has had hearing aids for 4 to 5 years.  Reduce effectiveness.  Would like hearing aid troubleshoot it.  - Adult Audiology  Referral               BMI:   Estimated body mass index is 30.54 kg/m  as calculated from the following:    Height as of this encounter: 1.803 m (5' 11\").    Weight as of this encounter: 99.3 kg (219 lb).           Fabián Cesar MD  [facility] Slater is a 77 year old, presenting for the following health issues:  Follow Up    Pt with acute saddle PE after spine surgery.  He is on DOAC and stable.   Recently had IVC removed.      Denies chest pain, dyspnea.    No ill SE statin therapy.     Walks 3mph for 1-1.5 miles.           View : No data to display.              History of Present Illness       Vascular Disease:  He presents for follow up of vascular disease.  He never takes nitroglycerin. He is not taking daily aspirin.He consumes 2 sweetened beverage(s) daily.He exercises with enough effort to increase his heart rate 20 to 29 minutes per day.  He exercises with " "enough effort to increase his heart rate 4 days per week.   He is taking medications regularly.           Review of Systems         Objective    /74 (BP Location: Right arm, Patient Position: Chair, Cuff Size: Adult Large)   Pulse 56   Temp 97.8  F (36.6  C) (Temporal)   Resp 18   Ht 1.803 m (5' 11\")   Wt 99.3 kg (219 lb)   SpO2 98%   BMI 30.54 kg/m    Body mass index is 30.54 kg/m .  Physical Exam   GENERAL: healthy, alert and no distress  NECK: no adenopathy, no asymmetry, masses, or scars and thyroid normal to palpation  RESP: lungs clear to auscultation - no rales, rhonchi or wheezes  CV: regular rate and rhythm, normal S1 S2, no S3 or S4, no murmur, click or rub, no peripheral edema and peripheral pulses strong  ABDOMEN: soft, nontender, no hepatosplenomegaly, no masses and bowel sounds normal  MS: no gross musculoskeletal defects noted, no edema                    "

## 2023-04-07 ENCOUNTER — OFFICE VISIT (OUTPATIENT)
Dept: FAMILY MEDICINE | Facility: CLINIC | Age: 78
End: 2023-04-07
Payer: MEDICARE

## 2023-04-07 VITALS
OXYGEN SATURATION: 98 % | BODY MASS INDEX: 30.66 KG/M2 | WEIGHT: 219 LBS | SYSTOLIC BLOOD PRESSURE: 136 MMHG | TEMPERATURE: 97.8 F | HEART RATE: 56 BPM | HEIGHT: 71 IN | RESPIRATION RATE: 18 BRPM | DIASTOLIC BLOOD PRESSURE: 74 MMHG

## 2023-04-07 DIAGNOSIS — H90.3 BILATERAL SENSORINEURAL HEARING LOSS: ICD-10-CM

## 2023-04-07 DIAGNOSIS — I26.02 ACUTE SADDLE PULMONARY EMBOLISM WITH ACUTE COR PULMONALE (H): Primary | ICD-10-CM

## 2023-04-07 DIAGNOSIS — Z11.59 NEED FOR HEPATITIS C SCREENING TEST: ICD-10-CM

## 2023-04-07 DIAGNOSIS — E78.5 HYPERLIPIDEMIA LDL GOAL <160: ICD-10-CM

## 2023-04-07 LAB
CHOLEST SERPL-MCNC: 156 MG/DL
HDLC SERPL-MCNC: 56 MG/DL
LDLC SERPL CALC-MCNC: 86 MG/DL
NONHDLC SERPL-MCNC: 100 MG/DL
TRIGL SERPL-MCNC: 71 MG/DL

## 2023-04-07 PROCEDURE — 86803 HEPATITIS C AB TEST: CPT | Performed by: INTERNAL MEDICINE

## 2023-04-07 PROCEDURE — 80061 LIPID PANEL: CPT | Performed by: INTERNAL MEDICINE

## 2023-04-07 PROCEDURE — 36415 COLL VENOUS BLD VENIPUNCTURE: CPT | Performed by: INTERNAL MEDICINE

## 2023-04-07 PROCEDURE — 99214 OFFICE O/P EST MOD 30 MIN: CPT | Performed by: INTERNAL MEDICINE

## 2023-04-07 RX ORDER — ATORVASTATIN CALCIUM 20 MG/1
20 TABLET, FILM COATED ORAL DAILY
Qty: 90 TABLET | Refills: 1 | Status: SHIPPED | OUTPATIENT
Start: 2023-04-07 | End: 2023-10-09

## 2023-04-07 RX ORDER — ATORVASTATIN CALCIUM 40 MG/1
40 TABLET, FILM COATED ORAL DAILY
Qty: 90 TABLET | Refills: 3 | Status: SHIPPED | OUTPATIENT
Start: 2023-04-07 | End: 2023-04-07 | Stop reason: DRUGHIGH

## 2023-04-07 ASSESSMENT — PAIN SCALES - GENERAL: PAINLEVEL: NO PAIN (0)

## 2023-04-08 LAB — HCV AB SERPL QL IA: NONREACTIVE

## 2023-10-08 DIAGNOSIS — E78.5 HYPERLIPIDEMIA LDL GOAL <160: ICD-10-CM

## 2023-10-09 PROBLEM — J96.01 ACUTE RESPIRATORY FAILURE WITH HYPOXIA (H): Status: RESOLVED | Noted: 2022-12-24 | Resolved: 2023-10-09

## 2023-10-09 RX ORDER — ATORVASTATIN CALCIUM 20 MG/1
20 TABLET, FILM COATED ORAL DAILY
Qty: 90 TABLET | Refills: 1 | Status: SHIPPED | OUTPATIENT
Start: 2023-10-09 | End: 2024-04-22

## 2023-10-24 ENCOUNTER — OFFICE VISIT (OUTPATIENT)
Dept: FAMILY MEDICINE | Facility: CLINIC | Age: 78
End: 2023-10-24
Payer: MEDICARE

## 2023-10-24 VITALS
HEIGHT: 71 IN | BODY MASS INDEX: 31.36 KG/M2 | RESPIRATION RATE: 16 BRPM | DIASTOLIC BLOOD PRESSURE: 66 MMHG | HEART RATE: 65 BPM | WEIGHT: 224 LBS | OXYGEN SATURATION: 96 % | SYSTOLIC BLOOD PRESSURE: 123 MMHG | TEMPERATURE: 97.4 F

## 2023-10-24 DIAGNOSIS — I26.02 ACUTE SADDLE PULMONARY EMBOLISM WITH ACUTE COR PULMONALE (H): Primary | ICD-10-CM

## 2023-10-24 PROCEDURE — 99214 OFFICE O/P EST MOD 30 MIN: CPT | Mod: 25 | Performed by: INTERNAL MEDICINE

## 2023-10-24 PROCEDURE — 90662 IIV NO PRSV INCREASED AG IM: CPT | Performed by: INTERNAL MEDICINE

## 2023-10-24 PROCEDURE — G0008 ADMIN INFLUENZA VIRUS VAC: HCPCS | Performed by: INTERNAL MEDICINE

## 2023-10-24 ASSESSMENT — PAIN SCALES - GENERAL: PAINLEVEL: NO PAIN (0)

## 2023-10-24 NOTE — PROGRESS NOTES
"  Assessment & Plan     Lengthy discussion with the patient today.  Patient had 1 isolated provoked thromboembolic event in December 2022.  This was about a month after spine surgery.  Of note this was his third spine surgery.    The patient has completed more than 9 months of anticoagulation.  The patient is completely asymptomatic.  His IVC filter is out.    We discussed several options.  On one hand this is an isolated provoked thromboembolic event.  However on the other hand the patient had significant clot burden in the sizable saddle pulmonary embolus at the time of diagnosis.    Patient is experiencing no risks or side effects with anticoagulant.  He does not find it to be cost prohibitive.    After much discussion we agreed to continue apixaban at 2-1/2 mg twice a day ongoing.    Acute saddle pulmonary embolism with acute cor pulmonale (H)    - apixaban ANTICOAGULANT (ELIQUIS) 2.5 MG tablet  Dispense: 180 tablet; Refill: 3        33 minutes spent by me on the date of the encounter doing chart review, review of test results, interpretation of tests, patient visit, documentation, and discussion with family        BMI:   Estimated body mass index is 31.24 kg/m  as calculated from the following:    Height as of this encounter: 1.803 m (5' 11\").    Weight as of this encounter: 101.6 kg (224 lb).           Fabián Cesar MD  [facility] Slater is a 78 year old, presenting for the following health issues:  Follow Up    Saddle PE:  Isolated and provoked in Dec 2022.      History of Present Illness       Hyperlipidemia:  He presents for follow up of hyperlipidemia.   He is taking medication to lower cholesterol. He is not having myalgia or other side effects to statin medications.    Vascular Disease:  He presents for follow up of vascular disease.     He never takes nitroglycerin. He is not taking daily aspirin.    He eats 0-1 servings of fruits and vegetables daily.He consumes 1 " "sweetened beverage(s) daily.He exercises with enough effort to increase his heart rate 9 or less minutes per day.  He exercises with enough effort to increase his heart rate 3 or less days per week.   He is taking medications regularly.           Review of Systems         Objective    /66 (BP Location: Left arm, Patient Position: Sitting, Cuff Size: Adult Regular)   Pulse 65   Temp 97.4  F (36.3  C)   Resp 16   Ht 1.803 m (5' 11\")   Wt 101.6 kg (224 lb)   SpO2 96%   BMI 31.24 kg/m    Body mass index is 31.24 kg/m .  Physical Exam   GENERAL: healthy, alert and no distress  NECK: no adenopathy, no asymmetry, masses, or scars and thyroid normal to palpation  RESP: lungs clear to auscultation - no rales, rhonchi or wheezes  CV: regular rate and rhythm, normal S1 S2, no S3 or S4, no murmur, click or rub, no peripheral edema and peripheral pulses strong  ABDOMEN: soft, nontender, no hepatosplenomegaly, no masses and bowel sounds normal  MS: no gross musculoskeletal defects noted, no edema                      "

## 2024-02-03 ENCOUNTER — HEALTH MAINTENANCE LETTER (OUTPATIENT)
Age: 79
End: 2024-02-03

## 2024-02-23 NOTE — PLAN OF CARE
Patient able to ambulate with assistance of PT   Physical Therapy Discharge Summary    Reason for therapy discharge:    All goals and outcomes met, no further needs identified.    Progress towards therapy goal(s). See goals on Care Plan in Epic electronic health record for goal details.  Goals met    Therapy recommendation(s):    Continued therapy is recommended.  Rationale/Recommendations:  Pt is near or just below baseline functional mobility level. Demonstrating safety and SBA with functional mobility including bed mobility, transfers, ambulation, and stair navigation without use of AD. Recommend intermittent assistance from spouse at home with higher level IADLs such as laundry and grocery shopping while pt continues to recover from spinal surgery and improves tolerance to activity. Recommend continued activity in the form of a progressive ambulation program at home using patient's treadmill.

## 2024-03-15 ENCOUNTER — TRANSFERRED RECORDS (OUTPATIENT)
Dept: HEALTH INFORMATION MANAGEMENT | Facility: CLINIC | Age: 79
End: 2024-03-15
Payer: MEDICARE

## 2024-04-03 ENCOUNTER — ANCILLARY PROCEDURE (OUTPATIENT)
Dept: ULTRASOUND IMAGING | Facility: CLINIC | Age: 79
End: 2024-04-03
Attending: UROLOGY
Payer: MEDICARE

## 2024-04-03 DIAGNOSIS — N28.1 CYST OF KIDNEY, ACQUIRED: ICD-10-CM

## 2024-04-03 PROCEDURE — 76770 US EXAM ABDO BACK WALL COMP: CPT

## 2024-04-20 DIAGNOSIS — E78.5 HYPERLIPIDEMIA LDL GOAL <160: ICD-10-CM

## 2024-04-22 RX ORDER — ATORVASTATIN CALCIUM 20 MG/1
20 TABLET, FILM COATED ORAL DAILY
Qty: 90 TABLET | Refills: 1 | Status: SHIPPED | OUTPATIENT
Start: 2024-04-22

## 2024-04-22 SDOH — HEALTH STABILITY: PHYSICAL HEALTH: ON AVERAGE, HOW MANY DAYS PER WEEK DO YOU ENGAGE IN MODERATE TO STRENUOUS EXERCISE (LIKE A BRISK WALK)?: 5 DAYS

## 2024-04-22 ASSESSMENT — SOCIAL DETERMINANTS OF HEALTH (SDOH): HOW OFTEN DO YOU GET TOGETHER WITH FRIENDS OR RELATIVES?: TWICE A WEEK

## 2024-04-29 ENCOUNTER — OFFICE VISIT (OUTPATIENT)
Dept: FAMILY MEDICINE | Facility: CLINIC | Age: 79
End: 2024-04-29
Payer: MEDICARE

## 2024-04-29 VITALS
OXYGEN SATURATION: 96 % | DIASTOLIC BLOOD PRESSURE: 86 MMHG | TEMPERATURE: 98.4 F | HEIGHT: 71 IN | BODY MASS INDEX: 32.48 KG/M2 | HEART RATE: 67 BPM | WEIGHT: 232 LBS | SYSTOLIC BLOOD PRESSURE: 142 MMHG | RESPIRATION RATE: 16 BRPM

## 2024-04-29 DIAGNOSIS — E78.5 HYPERLIPIDEMIA LDL GOAL <160: ICD-10-CM

## 2024-04-29 DIAGNOSIS — I26.02 ACUTE SADDLE PULMONARY EMBOLISM WITH ACUTE COR PULMONALE (H): ICD-10-CM

## 2024-04-29 DIAGNOSIS — Z00.00 ENCOUNTER FOR MEDICARE ANNUAL WELLNESS EXAM: Primary | ICD-10-CM

## 2024-04-29 LAB
ERYTHROCYTE [DISTWIDTH] IN BLOOD BY AUTOMATED COUNT: 13.1 % (ref 10–15)
HCT VFR BLD AUTO: 47.3 % (ref 40–53)
HGB BLD-MCNC: 16.1 G/DL (ref 13.3–17.7)
MCH RBC QN AUTO: 30 PG (ref 26.5–33)
MCHC RBC AUTO-ENTMCNC: 34 G/DL (ref 31.5–36.5)
MCV RBC AUTO: 88 FL (ref 78–100)
PLATELET # BLD AUTO: 233 10E3/UL (ref 150–450)
RBC # BLD AUTO: 5.36 10E6/UL (ref 4.4–5.9)
WBC # BLD AUTO: 5.1 10E3/UL (ref 4–11)

## 2024-04-29 PROCEDURE — 85027 COMPLETE CBC AUTOMATED: CPT | Performed by: INTERNAL MEDICINE

## 2024-04-29 PROCEDURE — 80061 LIPID PANEL: CPT | Performed by: INTERNAL MEDICINE

## 2024-04-29 PROCEDURE — 99213 OFFICE O/P EST LOW 20 MIN: CPT | Mod: 25 | Performed by: INTERNAL MEDICINE

## 2024-04-29 PROCEDURE — 84443 ASSAY THYROID STIM HORMONE: CPT | Performed by: INTERNAL MEDICINE

## 2024-04-29 PROCEDURE — 80053 COMPREHEN METABOLIC PANEL: CPT | Performed by: INTERNAL MEDICINE

## 2024-04-29 PROCEDURE — G0439 PPPS, SUBSEQ VISIT: HCPCS | Performed by: INTERNAL MEDICINE

## 2024-04-29 PROCEDURE — 36415 COLL VENOUS BLD VENIPUNCTURE: CPT | Performed by: INTERNAL MEDICINE

## 2024-04-29 ASSESSMENT — PAIN SCALES - GENERAL: PAINLEVEL: NO PAIN (0)

## 2024-04-29 NOTE — PROGRESS NOTES
"Preventive Care Visit  [facility]  Fabián Cesar MD, Internal Medicine  Apr 29, 2024      Assessment & Plan     Encounter for Medicare annual wellness exam  Age and gender appropriate preventive care and screenings are discussed.  Particular attention to personal preventive care and age appropriate lifestyle including the incorporation of healthy diet and physical activity is made.        Hyperlipidemia LDL goal <160  labs  - TSH WITH FREE T4 REFLEX  - CBC with Platelets  - Lipid panel reflex to direct LDL Non-fasting    Acute saddle pulmonary embolism with acute cor pulmonale (H)  On lifelong low dose DOAC.    - TSH WITH FREE T4 REFLEX  - CBC with Platelets  - Comprehensive metabolic panel (BMP + Alb, Alk Phos, ALT, AST, Total. Bili, TP)        BMI  Estimated body mass index is 32.36 kg/m  as calculated from the following:    Height as of this encounter: 1.803 m (5' 11\").    Weight as of this encounter: 105.2 kg (232 lb).       Counseling  Appropriate preventive services were discussed with this patient, including applicable screening as appropriate for fall prevention, nutrition, physical activity, Tobacco-use cessation, weight loss and cognition.  Checklist reviewing preventive services available has been given to the patient.  Reviewed patient's diet, addressing concerns and/or questions.   He is at risk for psychosocial distress and has been provided with information to reduce risk.           Laura lynn is a 78 year old, presenting for the following:  Physical          Health Care Directive  Patient does not have a Health Care Directive or Living Will: Patient states has Advance Directive and will bring in a copy to clinic.    HPI  HL on statin    PE:  Isolated but large. As a result we've opted to continue low dose DOAC lifelong.  Stable.            4/22/2024   General Health   How would you rate your overall physical health? Good   Feel stress (tense, anxious, or unable to " sleep) Only a little   (!) STRESS CONCERN      4/22/2024   Nutrition   Diet: Regular (no restrictions)         4/22/2024   Exercise   Days per week of moderate/strenous exercise 5 days         4/22/2024   Social Factors   Frequency of gathering with friends or relatives Twice a week   Worry food won't last until get money to buy more No   Food not last or not have enough money for food? No   Do you have housing?  Yes   Are you worried about losing your housing? No   Lack of transportation? No   Unable to get utilities (heat,electricity)? No         4/22/2024   Fall Risk   Fallen 2 or more times in the past year? No   Trouble with walking or balance? No          4/22/2024   Activities of Daily Living- Home Safety   Needs help with the following daily activites None of the above   Safety concerns in the home None of the above         4/22/2024   Dental   Dentist two times every year? Yes         4/22/2024   Hearing Screening   Hearing concerns? None of the above         4/22/2024   Driving Risk Screening   Patient/family members have concerns about driving No         4/22/2024   General Alertness/Fatigue Screening   Have you been more tired than usual lately? No         4/22/2024   Urinary Incontinence Screening   Bothered by leaking urine in past 6 months No         4/22/2024   TB Screening   Were you born outside of the [place]? No         Today's PHQ-2 Score:       4/29/2024     2:59 PM   PHQ-2 ( 1999 [employer])   Q1: Little interest or pleasure in doing things 0   Q2: Feeling down, depressed or hopeless 0   PHQ-2 Score 0   Q1: Little interest or pleasure in doing things Not at all   Q2: Feeling down, depressed or hopeless Not at all   PHQ-2 Score 0           4/22/2024   Substance Use   Alcohol more than 3/day or more than 7/wk No   Do you have a current opioid prescription? No   How severe/bad is pain from 1 to 10? 0/10 (No Pain)   Do you use any other substances recreationally? No     Social History     Tobacco Use     "Smoking status: Never    Smokeless tobacco: Never   Vaping Use    Vaping status: Never Used   Substance Use Topics    Alcohol use: Not Currently    Drug use: Not Currently       ASCVD Risk   The ASCVD Risk score (Ana ROONEY, et al., 2019) failed to calculate for the following reasons:    The patient has a prior MI or stroke diagnosis            Reviewed and updated as needed this visit by Provider                      Current providers sharing in care for this patient include:  Patient Care Team:  Fabián Cesar MD as PCP - General (Internal Medicine)  Fabián Cesar MD as Assigned PCP  Edy López MD as Assigned Heart and Vascular Provider    The following health maintenance items are reviewed in Epic and correct as of today:  Health Maintenance   Topic Date Due    HF ACTION PLAN  Never done    TSH W/FREE T4 REFLEX  Never done    ANNUAL REVIEW OF HM ORDERS  Never done    ADVANCE CARE PLANNING  Never done    RSV VACCINE (Pregnancy & 60+) (1 - 1-dose 60+ series) Never done    COVID-19 Vaccine (4 - 2023-24 season) 09/01/2023    BMP  09/28/2023    ALT  12/24/2023    CBC  03/28/2024    LIPID  04/07/2024    MEDICARE ANNUAL WELLNESS VISIT  04/29/2025    FALL RISK ASSESSMENT  04/29/2025    GLUCOSE  03/28/2026    DTAP/TDAP/TD IMMUNIZATION (3 - Td or Tdap) 07/18/2028    HEPATITIS C SCREENING  Completed    PHQ-2 (once per calendar year)  Completed    INFLUENZA VACCINE  Completed    Pneumococcal Vaccine: 65+ Years  Completed    ZOSTER IMMUNIZATION  Completed    IPV IMMUNIZATION  Aged Out    HPV IMMUNIZATION  Aged Out    MENINGITIS IMMUNIZATION  Aged Out    RSV MONOCLONAL ANTIBODY  Aged Out    COLORECTAL CANCER SCREENING  Discontinued            Objective    Exam  BP (!) 142/86   Pulse 67   Temp 98.4  F (36.9  C)   Resp 16   Ht 1.803 m (5' 11\")   Wt 105.2 kg (232 lb)   SpO2 96%   BMI 32.36 kg/m     Estimated body mass index is 32.36 kg/m  as calculated from the following:    Height as of this " "encounter: 1.803 m (5' 11\").    Weight as of this encounter: 105.2 kg (232 lb).    Physical Exam  GENERAL: alert and no distress  EYES: Eyes grossly normal to inspection, PERRL and conjunctivae and sclerae normal  HENT: ear canals and TM's normal, nose and mouth without ulcers or lesions  NECK: no adenopathy, no asymmetry, masses, or scars  RESP: lungs clear to auscultation - no rales, rhonchi or wheezes  CV: regular rate and rhythm, normal S1 S2, no S3 or S4, no murmur, click or rub, no peripheral edema  ABDOMEN: soft, nontender, no hepatosplenomegaly, no masses and bowel sounds normal  MS: no gross musculoskeletal defects noted, no edema  SKIN: no suspicious lesions or rashes  NEURO: Normal strength and tone, mentation intact and speech normal  PSYCH: mentation appears normal, affect normal/bright         4/29/2024   Mini Cog   Clock Draw Score 2 Normal   3 Item Recall 3 objects recalled   Mini Cog Total Score 5              Signed Electronically by: Fabián Cesar MD    "

## 2024-04-29 NOTE — PATIENT INSTRUCTIONS
LABS    Get an RSV vaccine at your drug store    Get VOLTAREN gel.  Ok to use up to 4 times a day as needed.    Preventive Care Advice   This is general advice given by our system to help you stay healthy. However, your care team may have specific advice just for you. Please talk to your care team about your preventive care needs.  Nutrition  Eat 5 or more servings of fruits and vegetables each day.  Try wheat bread, brown rice and whole grain pasta (instead of white bread, rice, and pasta).  Get enough calcium and vitamin D. Check the label on foods and aim for 100% of the RDA (recommended daily allowance).  Lifestyle  Exercise at least 150 minutes each week   (30 minutes a day, 5 days a week).  Do muscle strengthening activities 2 days a week. These help control your weight and prevent disease.  No smoking.  Wear sunscreen to prevent skin cancer.  Have a dental exam and cleaning every 6 months.  Yearly exams  See your health care team every year to talk about:  Any changes in your health.  Any medicines your care team has prescribed.  Preventive care, family planning, and ways to prevent chronic diseases.  Shots (vaccines)   HPV shots (up to age 26), if you've never had them before.  Hepatitis B shots (up to age 59), if you've never had them before.  COVID-19 shot: Get this shot when it's due.  Flu shot: Get a flu shot every year.  Tetanus shot: Get a tetanus shot every 10 years.  Pneumococcal, hepatitis A, and RSV shots: Ask your care team if you need these based on your risk.  Shingles shot (for age 50 and up).  General health tests  Diabetes screening:  Starting at age 35, Get screened for diabetes at least every 3 years.  If you are younger than age 35, ask your care team if you should be screened for diabetes.  Cholesterol test: At age 39, start having a cholesterol test every 5 years, or more often if advised.  Bone density scan (DEXA): At age 50, ask your care team if you should have this scan for  osteoporosis (brittle bones).  Hepatitis C: Get tested at least once in your life.  STIs (sexually transmitted infections)  Before age 24: Ask your care team if you should be screened for STIs.  After age 24: Get screened for STIs if you're at risk. You are at risk for STIs (including HIV) if:  You are sexually active with more than one person.  You don't use condoms every time.  You or a partner was diagnosed with a sexually transmitted infection.  If you are at risk for HIV, ask about PrEP medicine to prevent HIV.  Get tested for HIV at least once in your life, whether you are at risk for HIV or not.  Cancer screening tests  Cervical cancer screening: If you have a cervix, begin getting regular cervical cancer screening tests at age 21. Most people who have regular screenings with normal results can stop after age 65. Talk about this with your provider.  Breast cancer scan (mammogram): If you've ever had breasts, begin having regular mammograms starting at age 40. This is a scan to check for breast cancer.  Colon cancer screening: It is important to start screening for colon cancer at age 45.  Have a colonoscopy test every 10 years (or more often if you're at risk) Or, ask your provider about stool tests like a FIT test every year or Cologuard test every 3 years.  To learn more about your testing options, visit: [email or web address].  For help making a decision, visit: https://bit.ly/ye83267.  Prostate cancer screening test: If you have a prostate and are age 55 to 69, ask your provider if you would benefit from a yearly prostate cancer screening test.  Lung cancer screening: If you are a current or former smoker age 50 to 80, ask your care team if ongoing lung cancer screenings are right for you.  For informational purposes only. Not to replace the advice of your health care provider. Copyright   2023 [employer]. All rights reserved. Clinically reviewed by the [facility]  Transitions Program. [address] 028335 - REV 01/24.

## 2024-04-30 LAB
ALBUMIN SERPL BCG-MCNC: 4.4 G/DL (ref 3.5–5.2)
ALP SERPL-CCNC: 97 U/L (ref 40–150)
ALT SERPL W P-5'-P-CCNC: 19 U/L (ref 0–70)
ANION GAP SERPL CALCULATED.3IONS-SCNC: 11 MMOL/L (ref 7–15)
AST SERPL W P-5'-P-CCNC: 29 U/L (ref 0–45)
BILIRUB SERPL-MCNC: 0.9 MG/DL
BUN SERPL-MCNC: 10.4 MG/DL (ref 8–23)
CALCIUM SERPL-MCNC: 9.4 MG/DL (ref 8.8–10.2)
CHLORIDE SERPL-SCNC: 106 MMOL/L (ref 98–107)
CHOLEST SERPL-MCNC: 163 MG/DL
CREAT SERPL-MCNC: 0.93 MG/DL (ref 0.67–1.17)
DEPRECATED HCO3 PLAS-SCNC: 23 MMOL/L (ref 22–29)
EGFRCR SERPLBLD CKD-EPI 2021: 85 ML/MIN/1.73M2
FASTING STATUS PATIENT QL REPORTED: YES
GLUCOSE SERPL-MCNC: 99 MG/DL (ref 70–99)
HDLC SERPL-MCNC: 49 MG/DL
LDLC SERPL CALC-MCNC: 95 MG/DL
NONHDLC SERPL-MCNC: 114 MG/DL
POTASSIUM SERPL-SCNC: 4.3 MMOL/L (ref 3.4–5.3)
PROT SERPL-MCNC: 6.8 G/DL (ref 6.4–8.3)
SODIUM SERPL-SCNC: 140 MMOL/L (ref 135–145)
TRIGL SERPL-MCNC: 97 MG/DL
TSH SERPL DL<=0.005 MIU/L-ACNC: 1.07 UIU/ML (ref 0.3–4.2)

## 2024-10-19 DIAGNOSIS — E78.5 HYPERLIPIDEMIA LDL GOAL <160: ICD-10-CM

## 2024-10-21 RX ORDER — ATORVASTATIN CALCIUM 20 MG/1
20 TABLET, FILM COATED ORAL DAILY
Qty: 90 TABLET | Refills: 1 | Status: SHIPPED | OUTPATIENT
Start: 2024-10-21

## 2024-10-21 NOTE — TELEPHONE ENCOUNTER
Prescription approved per [facility] Refill Protocol.  Pippa Cosme RN  [facility]

## 2024-11-16 DIAGNOSIS — I26.02 ACUTE SADDLE PULMONARY EMBOLISM WITH ACUTE COR PULMONALE (H): ICD-10-CM

## 2024-11-18 RX ORDER — APIXABAN 2.5 MG/1
2.5 TABLET, FILM COATED ORAL 2 TIMES DAILY
Qty: 180 TABLET | Refills: 3 | Status: SHIPPED | OUTPATIENT
Start: 2024-11-18

## (undated) DEVICE — SUCTION FRAZIER 12FR W/HANDLE K73

## (undated) DEVICE — GOWN XLG DISP 9545

## (undated) DEVICE — SPONGE SURGIFOAM 100 1974

## (undated) DEVICE — CATH TRAY FOLEY COUDE SURESTEP 16FR W/URNE MTR STLK A304716A

## (undated) DEVICE — NDL SPINAL 18GA 3.5" 405184

## (undated) DEVICE — DRAPE IOBAN INCISE 23X17" 6650EZ

## (undated) DEVICE — NDL 20GA 1.5"

## (undated) DEVICE — ESU ELEC BLADE 2.75" COATED/INSULATED E1455

## (undated) DEVICE — ESU GROUND PAD UNIVERSAL W/O CORD

## (undated) DEVICE — SUCTION TIP YANKAUER STR K87

## (undated) DEVICE — CATH TRAY FOLEY SURESTEP 16FR WDRAIN BAG STLK LATEX A300316A

## (undated) DEVICE — SUCTION MANIFOLD NEPTUNE SGL

## (undated) DEVICE — SU ETHILON 3-0 FS-1 18" 669H

## (undated) DEVICE — DRSG GAUZE 4X4" 3033

## (undated) DEVICE — DRSG TEGADERM 4X4 3/4" 1626

## (undated) DEVICE — DRAPE STERI TOWEL LG 1010

## (undated) DEVICE — SOL NACL 0.9% INJ 250ML BAG 2B1322Q

## (undated) DEVICE — PACK UNIVERSAL SPLIT 29131

## (undated) DEVICE — SU CHROMIC 3-0 SH 27" G122H

## (undated) DEVICE — DRAPE MAYO STAND 23X54 8337

## (undated) DEVICE — TUBING SUCTION SOFT 20'X3/16" 0036570

## (undated) DEVICE — DRAPE C-ARMOR 5 SIDED 5523

## (undated) DEVICE — SOL NACL 0.9% IRRIG 1000ML BOTTLE 2F7124

## (undated) DEVICE — KIT DRAIN CLOSED WOUND SUCTION MED 400ML RESVR

## (undated) DEVICE — POSITIONER PT PRONESAFE HEAD REST W/DERMAPROX INSERT 40599

## (undated) DEVICE — KIT MAZOR X SPINE MEDT DISP KIT0574-06

## (undated) DEVICE — ESU ELEC BLADE 6" COATED/INSULATED E1455-6

## (undated) DEVICE — SU VICRYL 2-0 CT-1 18' J739D

## (undated) DEVICE — SOL WATER IRRIG 1000ML BOTTLE 2F7114

## (undated) DEVICE — SU VICRYL 0 CT-1 CR 8X18" J740D

## (undated) DEVICE — DRSG KERLIX FLUFFS X5

## (undated) DEVICE — DRAPE O ARM TUBE 9732722

## (undated) DEVICE — LIGHT HANDLE X2

## (undated) DEVICE — SU ETHILON 3-0 FS-1 18" 663G

## (undated) DEVICE — LINEN TOWEL PACK X5 5464

## (undated) DEVICE — PREP DURAPREP 26ML APL 8630

## (undated) DEVICE — Device

## (undated) DEVICE — DRAPE COVER C-ARM SEAMLESS SNAP-KAP 03-KP26 LATEX FREE

## (undated) DEVICE — SU VICRYL 0 CT-2 CR 8X18" J727D

## (undated) DEVICE — ESU CORD BIPOLAR 12' E0509

## (undated) DEVICE — DRAPE SHEET REV FOLD 3/4 9349

## (undated) DEVICE — TAPE DRSG UNIVERSAL CLOTH 3" WHITE LATEX 881-3

## (undated) DEVICE — GLOVE BIOGEL PI MICRO SZ 8.0 48580

## (undated) DEVICE — DRSG ADAPTIC 3X8" 6113

## (undated) DEVICE — PREP CHLORAPREP 26ML TINTED HI-LITE ORANGE 930815

## (undated) DEVICE — DRSG TEGADERM 2 1/2X 2 3/4"

## (undated) DEVICE — DRAPE LAP W/ARMBOARD 29410

## (undated) DEVICE — SU SILK 2-0 TIE 24" SA75H

## (undated) DEVICE — MARKER SPHERES PASSIVE MEDT PACK 5 8801075

## (undated) DEVICE — RX SURGIFLO HEMOSTATIC MATRIX W/THROMBIN 8ML 2994

## (undated) DEVICE — NDL 19GA 1.5"

## (undated) DEVICE — KIT PATIENT JACKSON 1 SPK10118

## (undated) DEVICE — DRAPE MICROSCOPE LEICA 54X150" AR8033650

## (undated) DEVICE — PACK LARGE SPINE SNE15LSFSE

## (undated) DEVICE — SU PROLENE 5-0 RB-1DA 36"  8556H

## (undated) DEVICE — DRSG TELFA ISLAND 4X10"

## (undated) DEVICE — MANIFOLD NEPTUNE 4 PORT 700-20

## (undated) DEVICE — SU MONOCRYL 3-0 PS-2 27" Y427H

## (undated) DEVICE — GLOVE BIOGEL PI MICRO INDICATOR UNDERGLOVE SZ 8.0 48980

## (undated) RX ORDER — FENTANYL CITRATE 50 UG/ML
INJECTION, SOLUTION INTRAMUSCULAR; INTRAVENOUS
Status: DISPENSED
Start: 2022-11-22

## (undated) RX ORDER — FENTANYL CITRATE 50 UG/ML
INJECTION, SOLUTION INTRAMUSCULAR; INTRAVENOUS
Status: DISPENSED
Start: 2022-12-24

## (undated) RX ORDER — ONDANSETRON 2 MG/ML
INJECTION INTRAMUSCULAR; INTRAVENOUS
Status: DISPENSED
Start: 2022-11-22

## (undated) RX ORDER — LIDOCAINE HYDROCHLORIDE 10 MG/ML
INJECTION, SOLUTION INFILTRATION; PERINEURAL
Status: DISPENSED
Start: 2023-03-28

## (undated) RX ORDER — DEXAMETHASONE SODIUM PHOSPHATE 4 MG/ML
INJECTION, SOLUTION INTRA-ARTICULAR; INTRALESIONAL; INTRAMUSCULAR; INTRAVENOUS; SOFT TISSUE
Status: DISPENSED
Start: 2022-11-22

## (undated) RX ORDER — HEPARIN SODIUM 200 [USP'U]/100ML
INJECTION, SOLUTION INTRAVENOUS
Status: DISPENSED
Start: 2022-12-24

## (undated) RX ORDER — HEPARIN SODIUM 1000 [USP'U]/ML
INJECTION, SOLUTION INTRAVENOUS; SUBCUTANEOUS
Status: DISPENSED
Start: 2022-12-24

## (undated) RX ORDER — VANCOMYCIN HYDROCHLORIDE 1 G/20ML
INJECTION, POWDER, LYOPHILIZED, FOR SOLUTION INTRAVENOUS
Status: DISPENSED
Start: 2022-11-22

## (undated) RX ORDER — LIDOCAINE HYDROCHLORIDE 10 MG/ML
INJECTION, SOLUTION INFILTRATION; PERINEURAL
Status: DISPENSED
Start: 2022-12-24

## (undated) RX ORDER — VECURONIUM BROMIDE 1 MG/ML
INJECTION, POWDER, LYOPHILIZED, FOR SOLUTION INTRAVENOUS
Status: DISPENSED
Start: 2022-11-22

## (undated) RX ORDER — CEFAZOLIN SODIUM 1 G/3ML
INJECTION, POWDER, FOR SOLUTION INTRAMUSCULAR; INTRAVENOUS
Status: DISPENSED
Start: 2022-11-22

## (undated) RX ORDER — FENTANYL CITRATE 50 UG/ML
INJECTION, SOLUTION INTRAMUSCULAR; INTRAVENOUS
Status: DISPENSED
Start: 2023-03-28

## (undated) RX ORDER — GLYCOPYRROLATE 0.2 MG/ML
INJECTION, SOLUTION INTRAMUSCULAR; INTRAVENOUS
Status: DISPENSED
Start: 2022-11-22

## (undated) RX ORDER — BUPIVACAINE HYDROCHLORIDE AND EPINEPHRINE 5; 5 MG/ML; UG/ML
INJECTION, SOLUTION EPIDURAL; INTRACAUDAL; PERINEURAL
Status: DISPENSED
Start: 2022-11-22

## (undated) RX ORDER — EPHEDRINE SULFATE 50 MG/ML
INJECTION, SOLUTION INTRAMUSCULAR; INTRAVENOUS; SUBCUTANEOUS
Status: DISPENSED
Start: 2022-11-22

## (undated) RX ORDER — HYDROMORPHONE HCL IN WATER/PF 6 MG/30 ML
PATIENT CONTROLLED ANALGESIA SYRINGE INTRAVENOUS
Status: DISPENSED
Start: 2022-11-22

## (undated) RX ORDER — HYDROMORPHONE HYDROCHLORIDE 1 MG/ML
INJECTION, SOLUTION INTRAMUSCULAR; INTRAVENOUS; SUBCUTANEOUS
Status: DISPENSED
Start: 2022-11-22

## (undated) RX ORDER — GABAPENTIN 100 MG/1
CAPSULE ORAL
Status: DISPENSED
Start: 2022-11-22

## (undated) RX ORDER — HEPARIN SODIUM 200 [USP'U]/100ML
INJECTION, SOLUTION INTRAVENOUS
Status: DISPENSED
Start: 2023-03-28

## (undated) RX ORDER — FENTANYL CITRATE 0.05 MG/ML
INJECTION, SOLUTION INTRAMUSCULAR; INTRAVENOUS
Status: DISPENSED
Start: 2022-11-22

## (undated) RX ORDER — CEFAZOLIN SODIUM/WATER 2 G/20 ML
SYRINGE (ML) INTRAVENOUS
Status: DISPENSED
Start: 2022-11-22